# Patient Record
Sex: FEMALE | NOT HISPANIC OR LATINO | URBAN - METROPOLITAN AREA
[De-identification: names, ages, dates, MRNs, and addresses within clinical notes are randomized per-mention and may not be internally consistent; named-entity substitution may affect disease eponyms.]

---

## 2023-02-07 ENCOUNTER — TELEPHONE (OUTPATIENT)
Dept: PSYCHIATRY | Facility: CLINIC | Age: 68
End: 2023-02-07

## 2023-02-07 NOTE — TELEPHONE ENCOUNTER
Patient called to inquire about [facility] therapy services  Writer advised patient that there is a waitlist and added the patient to the list  Also advised patient to call insurance company for additional resources

## 2023-02-08 NOTE — TELEPHONE ENCOUNTER
Pt called the [facility] office looking to be scheduled  Writer informed her of the list and once the pt found out she could not be scheduled with Olene Aschoff she declined services

## 2023-03-06 ENCOUNTER — TELEPHONE (OUTPATIENT)
Dept: PSYCHIATRY | Facility: CLINIC | Age: 68
End: 2023-03-06
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
## 2023-03-13 ENCOUNTER — TELEPHONE (OUTPATIENT)
Dept: PSYCHIATRY | Facility: CLINIC | Age: 68
End: 2023-03-13

## 2023-03-13 ENCOUNTER — TELEMEDICINE (OUTPATIENT)
Dept: PSYCHIATRY | Facility: CLINIC | Age: 68
End: 2023-03-13

## 2023-03-13 DIAGNOSIS — F43.10 POST TRAUMATIC STRESS DISORDER (PTSD): Primary | ICD-10-CM
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
## 2023-03-28 ENCOUNTER — TELEMEDICINE (OUTPATIENT)
Dept: PSYCHIATRY | Facility: CLINIC | Age: 68
End: 2023-03-28

## 2023-03-28 DIAGNOSIS — F43.10 POST TRAUMATIC STRESS DISORDER (PTSD): Primary | ICD-10-CM

## 2023-03-28 NOTE — BH TREATMENT PLAN
"[address] [place] [address] Drive, [address]  1955     Date of Initial Psychotherapy Assessment: 3/13/23   Date of Current Treatment Plan: 03/28/23  Treatment Plan Target Date: 09/13/23  Treatment Plan Expiration Date: 09/13/23    Diagnosis:   1  Post traumatic stress disorder (PTSD)            Area(s) of Need: \"I need to let all of that stuff go (trauma)\"      Long Term Goal 1 (in the client's own words): \"    Stage of Change: {SL AMB PSYCH STAGE OF EYEOJR:42766}    Target Date for completion: ***     Anticipated therapeutic modalities: ***     People identified to complete this goal: ***      Objective 1: (identify the means of measuring success in meeting the objective): ***      Objective 2: (identify the means of measuring success in meeting the objective): ***      Long Term Goal 2 (in the client's own words): ***    Stage of Change: {SL AMB PSYCH STAGE OF CRWRNH:65690}    Target Date for completion: ***     Anticipated therapeutic modalities: ***     People identified to complete this goal: ***      Objective 1: (identify the means of measuring success in meeting the objective): ***      Objective 2: (identify the means of measuring success in meeting the objective): ***     Long Term Goal 3 (in the client's own words): ***    Stage of Change: {SL AMB PSYCH STAGE OF KWJKMX:64953}    Target Date for completion: ***     Anticipated therapeutic modalities: ***     People identified to complete this goal: ***      Objective 1: (identify the means of measuring success in meeting the objective): ***      Objective 2: (identify the means of measuring success in meeting the objective): ***     I am currently under the care of a [facility]'s psychiatric provider: {YES/NO:20200}    My [facility] psychiatric provider is: ***    I am currently taking psychiatric medications: {SL AMB TAKING PSYCH MEDS:23578}    I feel that I will be ready for discharge from mental health care when I " reach the following (measurable goal/objective): ***    For children and adults who have a legal guardian:   Has there been any change to custody orders and/or guardianship status? {Yes/No/NA:38086}  If yes, attach updated documentation  I have { AMB PSYCH CREATED/UPDATED:50507} my Crisis Plan and have been offered a copy of this plan    [address]: Diagnosis and Treatment Plan explained to [address] [address] {acknowledge/does not acknowledge:[address]381} an understanding of their diagnosis  Lucrecia Pfeiffer { AMB PSYCH AGREE/DISAGREE:81332} this treatment plan  I have been offered a copy of this Treatment Plan   {YES/NO:20200}

## 2023-03-29 NOTE — PSYCH
Virtual Regular Visit    Verification of patient location:    Patient is located in the following state in which I hold an active license [place]      Assessment/Plan:    Problem List Items Addressed This Visit        Other    Post traumatic stress disorder (PTSD) - Primary       Goals addressed in session: Goal 1          Reason for visit is No chief complaint on file  Encounter provider Neo Reid LCSW    Provider located at [address] [place] [id number] [phone]      Recent Visits  Date Type Provider Dept   03/28/23 Telemedicine Nabil Michi Romero LCSW [facility]   Showing recent visits within past 7 days and meeting all other requirements  Future Appointments  No visits were found meeting these conditions  Showing future appointments within next 150 days and meeting all other requirements       The patient was identified by name and date of birth  Miguel Briggs was informed that this is a telemedicine visit and that the visit is being conducted throught[facility]  She agrees to proceed     My office door was closed  No one else was in the room  She acknowledged consent and understanding of privacy and security of the video platform  The patient has agreed to participate and understands they can discontinue the visit at any time  Patient is aware this is a billable service  Subjective  Miguel Briggs is a 78 y o  female    HPI     No past medical history on file  No past surgical history on file  No current outpatient medications on file  No current facility-administered medications for this visit  Not on File    Review of Systems    Video Exam    There were no vitals filed for this visit  Physical Exam     Behavioral Health Psychotherapy Progress Note    Psychotherapy Provided: Individual Psychotherapy     1  "Post traumatic stress disorder (PTSD)            Goals addressed in session:      DATA: Met with Leann Cantu for scheduled individual session  Topics of discussion included family stressors and trauma history  Client shows evidence of utilizing distress tolerance skills skills to manage mental health symptoms  During this session, this clinician used the following therapeutic modalities: mindfulness-based strategies and CBT techniques  Gaston Castro wanted to talk about her traumatic events during this session  She reported struggling to sleep and eat  She is going back to the gym and has an appointment with a psychiatrist     Substance Abuse was not addressed during this session  If the client is diagnosed with a co-occurring substance use disorder, please indicate any changes in the frequency or amount of use: n/a  Stage of change for addressing substance use diagnoses: No substance use/Not applicable    ASSESSMENT:  Pedro Stroud presents with a Euthymic/ normal mood  her affect is Normal range and intensity, which is congruent, with her mood and the content of the session  The client has made progress on their goals  Pedro Stroud presents with a none risk of suicide, none risk of self-harm, and none risk of harm to others  For any risk assessment that surpasses a \"low\" rating, a safety plan must be developed  A safety plan was indicated: no  If yes, describe in detail n/a    PLAN: Between sessions, Pedro Stroud will use CBT model to identify cognitive distortions  At the next session, the therapist will use Cognitive Behavioral Therapy to address PTSD  Behavioral Health Treatment Plan and Discharge Planning: Pedro Stroud is aware of and agrees to continue to work on their treatment plan  They have identified and are working toward their discharge goals   no    Visit start and stop times:    03/29/23  Start Time: 1330  Stop Time: 1426  Total Visit Time: 56 minutes      "

## 2023-05-04 ENCOUNTER — TELEMEDICINE (OUTPATIENT)
Dept: PSYCHIATRY | Facility: CLINIC | Age: 68
End: 2023-05-04

## 2023-05-04 DIAGNOSIS — F43.10 POST TRAUMATIC STRESS DISORDER (PTSD): Primary | ICD-10-CM

## 2023-05-04 NOTE — PSYCH
Virtual Regular Visit    Verification of patient location:    Patient is located at Home in the following state in which I hold an active license [place]      Assessment/Plan:    Problem List Items Addressed This Visit    None      Goals addressed in session: Goal 1          Reason for visit is No chief complaint on file  Encounter provider Oz Duran LCSW    Provider located at [address] [address]  [phone]      Recent Visits  No visits were found meeting these conditions  Showing recent visits within past 7 days and meeting all other requirements  Future Appointments  No visits were found meeting these conditions  Showing future appointments within next 150 days and meeting all other requirements       The patient was identified by name and date of birth  Smiley Phelan was informed that this is a telemedicine visit and [facility]  She agrees to proceed     My office door was closed  No one else was in the room  She acknowledged consent and understanding of privacy and security of the video platform  The patient has agreed to participate and understands they can discontinue the visit at any time  Patient is aware this is a billable service  Subjective  Smiley Phelan is a 78 y o  female    HPI     No past medical history on file  No past surgical history on file  No current outpatient medications on file  No current facility-administered medications for this visit  Not on File    Review of Systems    Video Exam    There were no vitals filed for this visit  Physical Exam     Behavioral Health Psychotherapy Progress Note    Psychotherapy Provided: Individual Psychotherapy     1   Post traumatic stress disorder (PTSD)            Goals addressed in session: Goal 1     DATA: Met with Maria R Vieira for "scheduled individual session  Topics of discussion included trauma history and physical health concerns  Client shows evidence of utilizing Mindfulness-based strategies and effective communication skills skills to manage mental health symptoms  During this session, this clinician used the following therapeutic modalities: mindfulness-based strategies and CBT techniques  Bertrand Shaffer reported improvement in practicing mindfulness and breathing activities  She was able to identify benefits on her physical and mental health  During this session, she learned about \"positive affirmations and their impact on her emotions  Bertrand Shaffer learned about \"the mindfulness meditation\"     Substance Abuse was not addressed during this session  If the client is diagnosed with a co-occurring substance use disorder, please indicate any changes in the frequency or amount of use: n/a  Stage of change for addressing substance use diagnoses: No substance use/Not applicable    ASSESSMENT:  Lorena You presents with a Euthymic/ normal mood  her affect is Normal range and intensity, which is congruent, with her mood and the content of the session  The client has made progress on their goals  Lorena You presents with a none risk of suicide, none risk of self-harm, and none risk of harm to others  For any risk assessment that surpasses a \"low\" rating, a safety plan must be developed  A safety plan was indicated: no  If yes, describe in detail n/a    PLAN: Between sessions, Lorena You will use MBSR strategie and CBT techniques  At the next session, the therapist will use Cognitive Behavioral Therapy and Mindfulness-based Strategies to address PTSD symptoms  Behavioral Health Treatment Plan and Discharge Planning: Lorena You is aware of and agrees to continue to work on their treatment plan  They have identified and are working toward their discharge goals   no    Visit start and stop " times:    05/04/23  Start Time: 1300

## 2023-05-18 ENCOUNTER — TELEMEDICINE (OUTPATIENT)
Dept: PSYCHIATRY | Facility: CLINIC | Age: 68
End: 2023-05-18

## 2023-05-18 DIAGNOSIS — F43.10 POST TRAUMATIC STRESS DISORDER (PTSD): Primary | ICD-10-CM

## 2023-05-18 NOTE — PSYCH
Virtual Regular Visit    Verification of patient location:    Patient is located at Home in the following state in which I hold an active license [place]      Assessment/Plan:    Problem List Items Addressed This Visit        Other    Post traumatic stress disorder (PTSD) - Primary       Goals addressed in session: Goal 1          Reason for visit is No chief complaint on file  Encounter provider Kimberly Keita LCSW    Provider located at [address] [place] [id number] [phone]      Recent Visits  No visits were found meeting these conditions  Showing recent visits within past 7 days and meeting all other requirements  Today's Visits  Date Type Provider Dept   05/18/23 Telemedicine Raquel Romero LCSW [facility]   Showing today's visits and meeting all other requirements  Future Appointments  No visits were found meeting these conditions  Showing future appointments within next 150 days and meeting all other requirements       The patient was identified by name and date of birth  Gayle Estrada was informed that this is a telemedicine visit and that [facility]  She agrees to proceed     My office door was closed  No one else was in the room  She acknowledged consent and understanding of privacy and security of the video platform  The patient has agreed to participate and understands they can discontinue the visit at any time  Patient is aware this is a billable service  Subjective  Gayle Estrada is a 78 y o  female  HPI     No past medical history on file  No past surgical history on file  No current outpatient medications on file  No current facility-administered medications for this visit          Not on File    Review of Systems    Video Exam    There were no vitals filed for this "visit  Physical Exam     Behavioral Health Psychotherapy Progress Note    Psychotherapy Provided: Individual Psychotherapy     1  Post traumatic stress disorder (PTSD)            Goals addressed in session: Goal 1     DATA: Bobo Herrera talked about her physical health and emotional  She is able to practice gratitude and mindfulness  To stay positive  She is able to identify moments of daria  Bobo Herrera is practicing assertive communication to set healthy boundaries and express her limitations and needs  Bobo Herrera talked about her possible brain surgery for Parkinson  During this session, this clinician used the following therapeutic modalities: Mindfulness-based Strategies    Substance Abuse was not addressed during this session  If the client is diagnosed with a co-occurring substance use disorder, please indicate any changes in the frequency or amount of use: n/a  Stage of change for addressing substance use diagnoses: No substance use/Not applicable    ASSESSMENT:  Janice Maradiaga presents with a Euthymic/ normal mood  her affect is Normal range and intensity, which is congruent, with her mood and the content of the session  The client has made progress on their goals  Janice Maradiaga presents with a none risk of suicide, none risk of self-harm, and none risk of harm to others  For any risk assessment that surpasses a \"low\" rating, a safety plan must be developed  A safety plan was indicated: no  If yes, describe in detail n/a    PLAN: Between sessions, Janice Maradiaga will use mindfulness strategies  At the next session, the therapist will use Mindfulness-based Strategies to address PTSD  Behavioral Health Treatment Plan and Discharge Planning: Janice Maradiaga is aware of and agrees to continue to work on their treatment plan  They have identified and are working toward their discharge goals   no    Visit start and stop times:    05/18/23  Start Time: 1300  Stop Time: 1346  Total Visit Time: " 46 minutes

## 2023-06-02 ENCOUNTER — TELEMEDICINE (OUTPATIENT)
Dept: PSYCHIATRY | Facility: CLINIC | Age: 68
End: 2023-06-02

## 2023-06-02 DIAGNOSIS — F43.10 POST TRAUMATIC STRESS DISORDER (PTSD): Primary | ICD-10-CM

## 2023-06-02 NOTE — PSYCH
Virtual Regular Visit    Verification of patient location:    Patient is located at Home in the following state in which I hold an active license [place]      Assessment/Plan:    Problem List Items Addressed This Visit        Other    Post traumatic stress disorder (PTSD) - Primary       Goals addressed in session: Goal 1          Reason for visit is No chief complaint on file  Encounter provider David Soto LCSW    Provider located at [address] [address] [phone]      Recent Visits  No visits were found meeting these conditions  Showing recent visits within past 7 days and meeting all other requirements  Today's Visits  Date Type Provider Dept   06/02/23 Telemedicine Sabi Romero LCSW [facility]   Showing today's visits and meeting all other requirements  Future Appointments  No visits were found meeting these conditions  Showing future appointments within next 150 days and meeting all other requirements       The patient was identified by name and date of birth  Jem Neo was informed that this is a telemedicine visit and that the [facility]  She agrees to proceed     My office door was closed  No one else was in the room  She acknowledged consent and understanding of privacy and security of the video platform  The patient has agreed to participate and understands they can discontinue the visit at any time  Patient is aware this is a billable service  Subjective  Jem Larson is a 78 y o  female    HPI     No past medical history on file  No past surgical history on file  No current outpatient medications on file  No current facility-administered medications for this visit          Not on File    Review of Systems    Video Exam    There were no vitals filed for this "visit  Physical Exam     Behavioral Health Psychotherapy Progress Note    Psychotherapy Provided: Individual Psychotherapy     1  Post traumatic stress disorder (PTSD)            Goals addressed in session: Goal 1     DATA: Sue Onofre continues to make progress in feeling relief about her past while talking about it  She is starting to prioritize herself  She find it a bit hard but is putting a lot of energy on it  Sue Onofre talked about her friendship and how much she likes to be helpful to them  She is working on finding a balance between helping them and making sure that she does not get physically hurt  Sue Onofre continues to have a positive mindset  During this session, this clinician used the following therapeutic modalities: Cognitive Behavioral Therapy and Mindfulness-based Strategies    Substance Abuse was not addressed during this session  If the client is diagnosed with a co-occurring substance use disorder, please indicate any changes in the frequency or amount of use: N/A  Stage of change for addressing substance use diagnoses: No substance use/Not applicable    ASSESSMENT:  Johnny Ferguson presents with a Euthymic/ normal mood  her affect is Normal range and intensity, which is congruent, with her mood and the content of the session  The client has made progress on their goals  Johnny Ferguson presents with a none risk of suicide, none risk of self-harm, and none risk of harm to others  For any risk assessment that surpasses a \"low\" rating, a safety plan must be developed  A safety plan was indicated: no  If yes, describe in detail N/A    PLAN: Between sessions, Johnny Ferguson will continue to use CBT and MBSR strategies to manage her symptoms of PTSD  At the next session, the therapist will use Cognitive Processing Therapy and Mindfulness-based Strategies to address PTSD      Behavioral Health Treatment Plan and Discharge Planning: Johnny Ferguson is aware of and agrees to " continue to work on their treatment plan  They have identified and are working toward their discharge goals   no    Visit start and stop times:    06/02/23  Start Time: 1130  Stop Time: 1222  Total Visit Time: 52 minutes

## 2023-06-20 ENCOUNTER — TELEMEDICINE (OUTPATIENT)
Dept: PSYCHIATRY | Facility: CLINIC | Age: 68
End: 2023-06-20
Payer: COMMERCIAL

## 2023-06-20 DIAGNOSIS — F43.10 POST TRAUMATIC STRESS DISORDER (PTSD): Primary | ICD-10-CM

## 2023-06-20 PROCEDURE — 90834 PSYTX W PT 45 MINUTES: CPT | Performed by: STUDENT IN AN ORGANIZED HEALTH CARE EDUCATION/TRAINING PROGRAM

## 2023-06-20 NOTE — PSYCH
Virtual Regular Visit    Verification of patient location:    Patient is located at Home in the following state in which I hold an active license [place]      Assessment/Plan:    Problem List Items Addressed This Visit        Other    Post traumatic stress disorder (PTSD) - Primary       Goals addressed in session: Goal 1          Reason for visit is No chief complaint on file  Encounter provider Merced Holland LCSW    Provider located at [address] [facility] [address] [phone]      Recent Visits  No visits were found meeting these conditions  Showing recent visits within past 7 days and meeting all other requirements  Today's Visits  Date Type Provider Dept   06/20/23 Telemedicine Becky Romero LCSW [facility]   Showing today's visits and meeting all other requirements  Future Appointments  No visits were found meeting these conditions  Showing future appointments within next 150 days and meeting all other requirements       The patient was identified by name and date of birth  Fredia Gaucher was informed that this is a telemedicine visit and that the [facility]  She agrees to proceed     My office door was closed  No one else was in the room  She acknowledged consent and understanding of privacy and security of the video platform  The patient has agreed to participate and understands they can discontinue the visit at any time  Patient is aware this is a billable service  Subjective  Fredia Gaucher is a 78 y o  female    HPI     No past medical history on file  No past surgical history on file  No current outpatient medications on file  No current facility-administered medications for this visit          Not on File    Review of Systems    Video Exam    There were no vitals filed for this "visit  Physical Exam       Behavioral Health Psychotherapy Progress Note    Psychotherapy Provided: Individual Psychotherapy     1  Post traumatic stress disorder (PTSD)            Goals addressed in session: Goal 1     DATA: Betty Bello reported feeling worried about her friend that is sick in the hospital  Betty Bello is able to identify cognitive distortions  Betty Bello uses positive affirmations to get out of cognitive distortions like catastrophic thinking  She is implementing mindfulness strategies (visualization and positive affirmations)  Betty Bello makes projections about her future on those who have Parkinson's  During this session, this clinician used the following therapeutic modalities: Cognitive Behavioral Therapy and Mindfulness-based Strategies    Substance Abuse was not addressed during this session  If the client is diagnosed with a co-occurring substance use disorder, please indicate any changes in the frequency or amount of use: n/a  Stage of change for addressing substance use diagnoses: No substance use/Not applicable    ASSESSMENT:  Porter Sebastian presents with a Anxious mood  her affect is Normal range and intensity, which is congruent, with her mood and the content of the session  The client has made progress on their goals  Porter Sebastian presents with a none risk of suicide, none risk of self-harm, and none risk of harm to others  For any risk assessment that surpasses a \"low\" rating, a safety plan must be developed  A safety plan was indicated: no  If yes, describe in detail n/a    PLAN: Between sessions, Porter Sebastian will use positive affirmations, visualization and deep breathing  At the next session, the therapist will use Cognitive Behavioral Therapy to address PTSD symptoms  Behavioral Health Treatment Plan and Discharge Planning: Porter Sebastian is aware of and agrees to continue to work on their treatment plan   They have identified and are working toward their " discharge goals   no    Visit start and stop times:    06/20/23  Start Time: 1330  Stop Time: 1420  Total Visit Time: 50 minutes

## 2023-07-06 ENCOUNTER — TELEMEDICINE (OUTPATIENT)
Dept: PSYCHIATRY | Facility: CLINIC | Age: 68
End: 2023-07-06
Payer: COMMERCIAL

## 2023-07-06 DIAGNOSIS — F43.10 POST TRAUMATIC STRESS DISORDER (PTSD): Primary | ICD-10-CM

## 2023-07-06 PROCEDURE — 90837 PSYTX W PT 60 MINUTES: CPT | Performed by: STUDENT IN AN ORGANIZED HEALTH CARE EDUCATION/TRAINING PROGRAM

## 2023-07-06 NOTE — PSYCH
Virtual Regular Visit    Verification of patient location:    Patient is located at Home in the following state in which I hold an [place] license [place]      Assessment/Plan:    Problem List Items Addressed This Visit        Other    Post traumatic stress disorder (PTSD) - Primary       Goals addressed in session: Goal 1          Reason for visit is No chief complaint on file. Encounter provider Wilman Waddell LCSW    Provider located at [address] [address] [phone]      Recent Visits  No visits were found meeting these conditions. Showing recent visits within past 7 days and meeting all other requirements  Today's Visits  Date Type Provider Dept   07/06/23 Telemedicine Amaury Romero LCSW [facility]   Showing today's visits and meeting all other requirements  Future Appointments  No visits were found meeting these conditions. Showing future appointments within next 150 days and meeting all other requirements       The patient was identified by name and date of birth. Cristobal Tong was informed that this is a telemedicine visit and [facility] She agrees to proceed. .  My office door was closed. No one else was in the room. She acknowledged consent and understanding of privacy and security of the video platform. The patient has agreed to participate and understands they can discontinue the visit at any time. Patient is aware this is a billable service. Subjective  Cristobal Tong is a 78 y.o. female  . HPI     No past medical history on file. No past surgical history on file. No current outpatient medications on file. No current facility-administered medications for this visit.         Not on File    Review of Systems    Video Exam    There were no vitals filed for this visit.    Physical Exam     Behavioral Health Psychotherapy Progress Note    Psychotherapy Provided: Individual Psychotherapy     1. Post traumatic stress disorder (PTSD)            Goals addressed in session: Goal 1     DATA: Belen Holt reported feeling very stressed due to situation happening in her to her daughter and her friend. Belen Holt continues to make progress in implementing grounding activities by walking bare foot in the grass. She is walking more consistently. Belen Holt talked about her feeling about brain surgery. She processed her thoughts and emotions about it. During this session, this clinician used the following therapeutic modalities: Client-centered Therapy and Cognitive Behavioral Therapy    Substance Abuse was not addressed during this session. If the client is diagnosed with a co-occurring substance use disorder, please indicate any changes in the frequency or amount of use: n/a. Stage of change for addressing substance use diagnoses: No substance use/Not applicable    ASSESSMENT:  Vee Duran presents with a Euthymic/ normal mood. her affect is Normal range and intensity, which is congruent, with her mood and the content of the session. The client has made progress on their goals. Vee Duran presents with a none risk of suicide, none risk of self-harm, and none risk of harm to others. For any risk assessment that surpasses a "low" rating, a safety plan must be developed. A safety plan was indicated: no  If yes, describe in detail n/a    PLAN: Between sessions, Vee Duran will continue to use MBSR strategies to manage her feelings of anxiety. At the next session, the therapist will use Cognitive Behavioral Therapy and Mindfulness-based Strategies to address PTSD symptoms. Behavioral Health Treatment Plan and Discharge Planning: Vee Duran is aware of and agrees to continue to work on their treatment plan.  They have identified and are working toward their discharge goals.  no    Visit start and stop times:    07/06/23  Start Time: 1305  Stop Time: 1358  Total Visit Time: 53 minutes

## 2023-07-20 ENCOUNTER — TELEMEDICINE (OUTPATIENT)
Dept: PSYCHIATRY | Facility: CLINIC | Age: 68
End: 2023-07-20

## 2023-07-20 DIAGNOSIS — F43.10 POST TRAUMATIC STRESS DISORDER (PTSD): Primary | ICD-10-CM

## 2023-07-20 NOTE — PSYCH
Virtual Regular Visit    Verification of patient location:    Patient is located at Home in the following state in which I hold an active license [place]      Assessment/Plan:    Problem List Items Addressed This Visit        Other    Post traumatic stress disorder (PTSD) - Primary       Goals addressed in session: Goal 1          Reason for visit is No chief complaint on file. Encounter provider Lenny Ballard LCSW    Provider located at [address] [place] [address] [phone]      Recent Visits  No visits were found meeting these conditions. Showing recent visits within past 7 days and meeting all other requirements  Future Appointments  No visits were found meeting these conditions. Showing future appointments within next 150 days and meeting all other requirements       The patient was identified by name and date of birth. Howie Valdes was informed that this is a telemedicine visit and that [email or web address]. She agrees to proceed. .  My office door was closed. No one else was in the room. She acknowledged consent and understanding of privacy and security of the video platform. The patient has agreed to participate and understands they can discontinue the visit at any time. Patient is aware this is a billable service. Jay Valdes is a 78 y.o. female. HPI     No past medical history on file. No past surgical history on file. No current outpatient medications on file. No current facility-administered medications for this visit. Not on File    Review of Systems    Video Exam    There were no vitals filed for this visit. Physical Exam     Behavioral Health Psychotherapy Progress Note    Psychotherapy Provided: Individual Psychotherapy     1.  Post traumatic stress disorder (PTSD)            Goals addressed in session: Goal 1     DATA: Sandra Almanza is implementing CBT strategies to manage her feelings of anxiety. Sandra Almanza uses humor to cope   During this session, this clinician used the following therapeutic modalities: Client-centered Therapy and Cognitive Behavioral Therapy    Substance Abuse was not addressed during this session. If the client is diagnosed with a co-occurring substance use disorder, please indicate any changes in the frequency or amount of use: n/a. Stage of change for addressing substance use diagnoses: No substance use/Not applicable    ASSESSMENT:  Frank Kelley presents with a Euthymic/ normal mood. her affect is Normal range and intensity, which is congruent, with her mood and the content of the session. The client has made progress on their goals. Frank Kelley presents with a none risk of suicide, none risk of self-harm, and none risk of harm to others. For any risk assessment that surpasses a "low" rating, a safety plan must be developed. A safety plan was indicated: no  If yes, describe in detail n/a    PLAN: Between sessions, Frank Kelley will continue to excersice, use positive thinking, positive affirmations, and mindfulness strategies. At the next session, the therapist will use Client-centered Therapy to address PTSD symptms. Behavioral Health Treatment Plan and Discharge Planning: Frank Kelley is aware of and agrees to continue to work on their treatment plan. They have identified and are working toward their discharge goals.  no    Visit start and stop times:    07/20/23  Start Time: 1300

## 2023-08-03 ENCOUNTER — TELEMEDICINE (OUTPATIENT)
Dept: PSYCHIATRY | Facility: CLINIC | Age: 68
End: 2023-08-03
Payer: COMMERCIAL

## 2023-08-03 DIAGNOSIS — F43.10 POST TRAUMATIC STRESS DISORDER (PTSD): Primary | ICD-10-CM

## 2023-08-03 PROCEDURE — 90834 PSYTX W PT 45 MINUTES: CPT | Performed by: STUDENT IN AN ORGANIZED HEALTH CARE EDUCATION/TRAINING PROGRAM

## 2023-08-03 NOTE — PSYCH
Virtual Regular Visit    Verification of patient location:    Patient is located at Home in the following state in which I hold an active license [place]      Assessment/Plan:    Problem List Items Addressed This Visit        Other    Post traumatic stress disorder (PTSD) - Primary       Goals addressed in session: Goal 1          Reason for visit is No chief complaint on file. Encounter provider Beverly Salgado LCSW    Provider located at [address] [place] [id number] [phone]      Recent Visits  No visits were found meeting these conditions. Showing recent visits within past 7 days and meeting all other requirements  Today's Visits  Date Type Provider Dept   08/03/23 Telemedicine Indra Romero LCSW [facility]   Showing today's visits and meeting all other requirements  Future Appointments  No visits were found meeting these conditions. Showing future appointments within next 150 days and meeting all other requirements       The patient was identified by name and date of birth. Kelby Solis was informed that this is a telemedicine visit and [facility] She agrees to proceed. .  My office door was closed. No one else was in the room. She acknowledged consent and understanding of privacy and security of the video platform. The patient has agreed to participate and understands they can discontinue the visit at any time. Patient is aware this is a billable service. Subjective  Kelby Solis is a 78 y.o. female. HPI     No past medical history on file. No past surgical history on file. No current outpatient medications on file. No current facility-administered medications for this visit.         Not on File    Review of Systems    Video Exam    There were no vitals filed for this visit.    Physical Exam     Behavioral Health Psychotherapy Progress Note    Psychotherapy Provided: Individual Psychotherapy     1. Post traumatic stress disorder (PTSD)            Goals addressed in session: Goal 1     DATA: Asia Pham processed her feelings about her surgery and in ways that she may be sabotaging her opportunity to have this procedure. Asia Pham made significant connections during this session. During this session, this clinician used the following therapeutic modalities: Client-centered Therapy, Cognitive Behavioral Therapy, Mindfulness-based Strategies and Motivational Interviewing    Substance Abuse was not addressed during this session. If the client is diagnosed with a co-occurring substance use disorder, please indicate any changes in the frequency or amount of use: n/a. Stage of change for addressing substance use diagnoses: No substance use/Not applicable    ASSESSMENT:  Himanshu Cunningham presents with a Euthymic/ normal mood. her affect is Normal range and intensity, which is congruent, with her mood and the content of the session. The client has made progress on their goals. Himanshu Cunningham presents with a none risk of suicide, none risk of self-harm, and none risk of harm to others. For any risk assessment that surpasses a "low" rating, a safety plan must be developed. A safety plan was indicated: no  If yes, describe in detail n/a    PLAN: Between sessions, Himanshu Cunningham will prioritize herself and go to the gym. At the next session, the therapist will use Client-centered Therapy, Cognitive Behavioral Therapy and Mindfulness-based Strategies to address PTSD. Behavioral Health Treatment Plan and Discharge Planning: Himanshu Cunningham is aware of and agrees to continue to work on their treatment plan. They have identified and are working toward their discharge goals.  no    Visit start and stop times:    08/03/23  Start Time: 1400  Stop Time: 1448  Total Visit Time: 48 minutes

## 2023-08-17 ENCOUNTER — TELEMEDICINE (OUTPATIENT)
Dept: PSYCHIATRY | Facility: CLINIC | Age: 68
End: 2023-08-17
Payer: COMMERCIAL

## 2023-08-17 DIAGNOSIS — F43.10 POST TRAUMATIC STRESS DISORDER (PTSD): Primary | ICD-10-CM

## 2023-08-17 PROCEDURE — 90834 PSYTX W PT 45 MINUTES: CPT | Performed by: STUDENT IN AN ORGANIZED HEALTH CARE EDUCATION/TRAINING PROGRAM

## 2023-08-21 NOTE — PSYCH
Virtual Regular Visit    Verification of patient location:    Patient is located at Home in the following state in which I hold an active license [place]      Assessment/Plan:    Problem List Items Addressed This Visit        Other    Post traumatic stress disorder (PTSD) - Primary       Goals addressed in session: Goal 1          Reason for visit is No chief complaint on file. Encounter provider Camryn Castorena LCSW    Provider located at [address] [place] [id number] [phone]      Recent Visits  Date Type Provider Dept   08/17/23 Telemedicine Arthur Romero LCSW [facility]   Showing recent visits within past 7 days and meeting all other requirements  Future Appointments  No visits were found meeting these conditions. Showing future appointments within next 150 days and meeting all other requirements       The patient was identified by name and date of birth. Venus Dupree was informed that this is a telemedicine visit and that [facility]. She agrees to proceed. .  My office door was closed. No one else was in the room. She acknowledged consent and understanding of privacy and security of the video platform. The patient has agreed to participate and understands they can discontinue the visit at any time. Patient is aware this is a billable service. Subjective  Venus Dupree is a 78 y.o. female  . HPI     No past medical history on file. No past surgical history on file. No current outpatient medications on file. No current facility-administered medications for this visit. Not on File    Review of Systems    Video Exam    There were no vitals filed for this visit.     Physical Exam     Behavioral Health Psychotherapy Progress Note    Psychotherapy Provided: Individual Psychotherapy     1. Post traumatic stress disorder (PTSD)            Goals addressed in session: Goal 1     DATA: Ronnie Lara continues to make great progress in implementing mindfulness and prioritizing herself. She is exersing more regularly and is working on identifying negative thoughts. During this session, this clinician used the following therapeutic modalities: Cognitive Behavioral Therapy    Substance Abuse was not addressed during this session. If the client is diagnosed with a co-occurring substance use disorder, please indicate any changes in the frequency or amount of use: n/a. Stage of change for addressing substance use diagnoses: No substance use/Not applicable    ASSESSMENT:  Lara Tristan presents with a Euthymic/ normal mood. her affect is Normal range and intensity, which is congruent, with her mood and the content of the session. The client has made progress on their goals. Lara Tristan presents with a none risk of suicide, none risk of self-harm, and none risk of harm to others. For any risk assessment that surpasses a "low" rating, a safety plan must be developed. A safety plan was indicated: no  If yes, describe in detail n/a    PLAN: Between sessions, Lara Tristan will use CBT strategies to identify cognitive distortions. At the next session, the therapist will use Cognitive Behavioral Therapy to address PTSD symptoms. Behavioral Health Treatment Plan and Discharge Planning: Lara Tristan is aware of and agrees to continue to work on their treatment plan. They have identified and are working toward their discharge goals.  no    Visit start and stop times:    08/17/23  Start Time: 1400  Stop Time: 1445  Total Visit Time: 45 minutes

## 2023-08-31 ENCOUNTER — TELEMEDICINE (OUTPATIENT)
Dept: PSYCHIATRY | Facility: CLINIC | Age: 68
End: 2023-08-31
Payer: COMMERCIAL

## 2023-08-31 DIAGNOSIS — F43.10 POST TRAUMATIC STRESS DISORDER (PTSD): Primary | ICD-10-CM

## 2023-08-31 PROCEDURE — 90837 PSYTX W PT 60 MINUTES: CPT | Performed by: STUDENT IN AN ORGANIZED HEALTH CARE EDUCATION/TRAINING PROGRAM

## 2023-08-31 NOTE — PSYCH
Virtual Regular Visit    Verification of patient location:    Patient is located at Home in the following state in which I hold an active license [place]      Assessment/Plan:    Problem List Items Addressed This Visit        Other    Post traumatic stress disorder (PTSD) - Primary       Goals addressed in session: Goal 1          Reason for visit is No chief complaint on file. Encounter provider Toma Acevedo LCSW    Provider located at [address] [place] [id number] [phone]      Recent Visits  Date Type Provider Dept   08/31/23 Telemedicine Elizabeth Romero LCSW [facility]   Showing recent visits within past 7 days and meeting all other requirements  Future Appointments  No visits were found meeting these conditions. Showing future appointments within next 150 days and meeting all other requirements       The patient was identified by name and date of birth. Sharmila Dominique was informed that this is a telemedicine visit and that the [facility] [email or web address] "[employer] She agrees to proceed. .  My office door was closed. No one else was in the room. She acknowledged consent and understanding of privacy and security of the video platform. The patient has agreed to participate and understands they can discontinue the visit at any time. Patient is aware this is a billable service. Subjective  Sharmila Dominique is a 78 y.o. female. HPI     No past medical history on file. No past surgical history on file. No current outpatient medications on file. No current facility-administered medications for this visit. Not on File    Review of Systems    Video Exam    There were no vitals filed for this visit.     Physical Exam     Behavioral Health Psychotherapy Progress Note    Psychotherapy Provided: Individual Psychotherapy 1. Post traumatic stress disorder (PTSD)            Goals addressed in session: Goal 1     DATA: Liz Smith continues to make progress at implementing CBT and MBSR strategies to cope with her feelings of anxiety and depression. She is practicing kindness, moments of daria, and to identify cognitive distortions. Liz Smith is working hard on being social. Liz Smith processed her feelings about previous traumatic events. During this session, this clinician used the following therapeutic modalities: Cognitive Behavioral Therapy    Substance Abuse was not addressed during this session. If the client is diagnosed with a co-occurring substance use disorder, please indicate any changes in the frequency or amount of use: NA/. Stage of change for addressing substance use diagnoses: No substance use/Not applicable    ASSESSMENT:  Chanel Holden presents with a Euthymic/ normal mood. her affect is Normal range and intensity, which is congruent, with her mood and the content of the session. The client has made progress on their goals. Chanel Holden presents with a none risk of suicide, none risk of self-harm, and none risk of harm to others. For any risk assessment that surpasses a "low" rating, a safety plan must be developed. A safety plan was indicated: no  If yes, describe in detail N/A    PLAN: Between sessions, Chanel Holden will continue to implement CBT strategies. At the next session, the therapist will use Cognitive Behavioral Therapy to address ANXIETY. Behavioral Health Treatment Plan and Discharge Planning: Chanel Holden is aware of and agrees to continue to work on their treatment plan. They have identified and are working toward their discharge goals.  no    Visit start and stop times:    08/31/23  Start Time: 1400  Stop Time: 1457  Total Visit Time: 57 minutes

## 2023-09-13 ENCOUNTER — TELEMEDICINE (OUTPATIENT)
Dept: PSYCHIATRY | Facility: CLINIC | Age: 68
End: 2023-09-13
Payer: COMMERCIAL

## 2023-09-13 DIAGNOSIS — F43.10 POST TRAUMATIC STRESS DISORDER (PTSD): Primary | ICD-10-CM

## 2023-09-13 PROCEDURE — 90837 PSYTX W PT 60 MINUTES: CPT | Performed by: STUDENT IN AN ORGANIZED HEALTH CARE EDUCATION/TRAINING PROGRAM

## 2023-09-13 NOTE — PSYCH
Virtual Regular Visit    Verification of patient location:    Patient is located at Home in the following state in which I hold an active license [place]      Assessment/Plan:    Problem List Items Addressed This Visit        Other    Post traumatic stress disorder (PTSD) - Primary       Goals addressed in session: Goal 1          Reason for visit is No chief complaint on file. Encounter provider Manuel Castellanos LCSW    Provider located at [address] [place] [id number] [phone]      Recent Visits  No visits were found meeting these conditions. Showing recent visits within past 7 days and meeting all other requirements  Today's Visits  Date Type Provider Dept   09/13/23 Telemedicine Nithinalysa Romero LCSW [facility]   Showing today's visits and meeting all other requirements  Future Appointments  No visits were found meeting these conditions. Showing future appointments within next 150 days and meeting all other requirements       The patient was identified by name and date of birth. Phuc Carrasquillo was informed that this is a telemedicine visit and that [facility] [place] [facility] She agrees to proceed. .  My office door was closed. No one else was in the room. She acknowledged consent and understanding of privacy and security of the video platform. The patient has agreed to participate and understands they can discontinue the visit at any time. Patient is aware this is a billable service. Subjective  Phuc Carrasquillo is a 78 y.o. female. HPI     No past medical history on file. No past surgical history on file. No current outpatient medications on file. No current facility-administered medications for this visit.         Not on File    Review of Systems    Video Exam    There were no vitals filed for this visit.    Physical Exam     Behavioral Health Psychotherapy Progress Note    Psychotherapy Provided: Individual Psychotherapy     1. Post traumatic stress disorder (PTSD)            Goals addressed in session: Goal 1     DATA: Sanna Stevens processed her feelings about her relationship with her youngest daughter. She was able to identify positive coping skills. Sanna Stevens processed previous traumatic episodes. During this session, this clinician used the following therapeutic modalities: Client-centered Therapy and Cognitive Behavioral Therapy    Substance Abuse was not addressed during this session. If the client is diagnosed with a co-occurring substance use disorder, please indicate any changes in the frequency or amount of use: n/a. Stage of change for addressing substance use diagnoses: No substance use/Not applicable    ASSESSMENT:  Percy Fountain presents with a Euthymic/ normal mood. her affect is Normal range and intensity, which is congruent, with her mood and the content of the session. The client has made progress on their goals. Percy Fountain presents with a none risk of suicide, none risk of self-harm, and none risk of harm to others. For any risk assessment that surpasses a "low" rating, a safety plan must be developed. A safety plan was indicated: no  If yes, describe in detail n/a    PLAN: Between sessions, Percy Fountain will continue to implement positive coping skills. At the next session, the therapist will use Cognitive Behavioral Therapy to address PTSD symptoms. Behavioral Health Treatment Plan and Discharge Planning: Percy Fountain is aware of and agrees to continue to work on their treatment plan. They have identified and are working toward their discharge goals.  no    Visit start and stop times:    09/13/23  Start Time: 1300  Stop Time: 1355  Total Visit Time: 55 minutes

## 2023-09-27 ENCOUNTER — TELEMEDICINE (OUTPATIENT)
Dept: PSYCHIATRY | Facility: CLINIC | Age: 68
End: 2023-09-27
Payer: COMMERCIAL

## 2023-09-27 DIAGNOSIS — F43.10 POST TRAUMATIC STRESS DISORDER (PTSD): Primary | ICD-10-CM

## 2023-09-27 PROCEDURE — 90834 PSYTX W PT 45 MINUTES: CPT | Performed by: STUDENT IN AN ORGANIZED HEALTH CARE EDUCATION/TRAINING PROGRAM

## 2023-09-27 NOTE — PSYCH
Virtual Regular Visit    Verification of patient location:    Patient is located at Home in the following state in which I hold an active license [place]      Assessment/Plan:    Problem List Items Addressed This Visit    None      Goals addressed in session: Goal 1          Reason for visit is No chief complaint on file. Encounter provider Stephy Elizondo LCSW    Provider located at [address] [place] [id number] [phone]      Recent Visits  No visits were found meeting these conditions. Showing recent visits within past 7 days and meeting all other requirements  Future Appointments  No visits were found meeting these conditions. Showing future appointments within next 150 days and meeting all other requirements       The patient was identified by name and date of birth. Jung Calvillo was informed that this is a telemedicine visit and that [facility] [employer]. She agrees to proceed. .  My office door was closed. No one else was in the room. She acknowledged consent and understanding of privacy and security of the video platform. The patient has agreed to participate and understands they can discontinue the visit at any time. Patient is aware this is a billable service. Subjective  Jung Calvillo is a 78 y.o. female  . HPI     No past medical history on file. No past surgical history on file. No current outpatient medications on file. No current facility-administered medications for this visit. Not on File    Review of Systems    Video Exam    There were no vitals filed for this visit. Physical Exam     Behavioral Health Psychotherapy Progress Note    Psychotherapy Provided: Individual Psychotherapy     1.  Post traumatic stress disorder (PTSD)            Goals addressed in session: Goal 1     DATA: Matias Borges processed her feelings about her friend and her relationship with her daughters. Ney Baron is making great progress at implementing mindfulness strategies to cope with her feelings of sadness. Ney Baron is working on being active. During this session, this clinician used the following therapeutic modalities: Cognitive Behavioral Therapy    Substance Abuse was not addressed during this session. If the client is diagnosed with a co-occurring substance use disorder, please indicate any changes in the frequency or amount of use: n/a. Stage of change for addressing substance use diagnoses: No substance use/Not applicable    ASSESSMENT:  Salvador Briones presents with a Euthymic/ normal mood. her affect is Normal range and intensity, which is congruent, with her mood and the content of the session. The client has made progress on their goals. Salvador Briones presents with a none risk of suicide, none risk of self-harm, and none risk of harm to others. For any risk assessment that surpasses a "low" rating, a safety plan must be developed. A safety plan was indicated: no  If yes, describe in detail n/a    PLAN: Between sessions, Salvador Briones will implement MBSR strategies. At the next session, the therapist will use Cognitive Behavioral Therapy and Mindfulness-based Strategies to address ptsd symptoms. Behavioral Health Treatment Plan and Discharge Planning: Salvador Briones is aware of and agrees to continue to work on their treatment plan. They have identified and are working toward their discharge goals.  no    Visit start and stop times:    09/27/23  Start Time: 1300  Stop Time: 1352  Total Visit Time: 52 minutes

## 2023-10-11 ENCOUNTER — TELEMEDICINE (OUTPATIENT)
Dept: PSYCHIATRY | Facility: CLINIC | Age: 68
End: 2023-10-11
Payer: COMMERCIAL

## 2023-10-11 DIAGNOSIS — F43.10 POST TRAUMATIC STRESS DISORDER (PTSD): Primary | ICD-10-CM

## 2023-10-11 PROCEDURE — 90834 PSYTX W PT 45 MINUTES: CPT | Performed by: STUDENT IN AN ORGANIZED HEALTH CARE EDUCATION/TRAINING PROGRAM

## 2023-10-11 NOTE — BH TREATMENT PLAN
Outpatient Behavioral Health Psychotherapy Treatment Plan    Lucy Pinzon  1955     Date of Initial Psychotherapy Assessment: 3/13/23   Date of Current Treatment Plan: 10/11/23  Treatment Plan Target Date: 4/11/23  Treatment Plan Expiration Date: 4/1123    Diagnosis:   1. Post traumatic stress disorder (PTSD)            Area(s) of Need: I want to be more consistent in working out. Long Term Goal 1 (in the client's own words): I would like to be healthier and feel better about myself. And maybe if things go better manjinder my surgery for my parkinson. Stage of Change: Action    Target Date for completion: 4/11/24     Anticipated therapeutic modalities: CBT and client centered     People identified to complete this goal: Kael Pfeiffer and Helena Romero LCSW      Objective 1: (identify the means of measuring success in meeting the objective): "Kevin Wiley will practice and implement CBT and MBSR strategies to identify thoughts and behaviors that prevent her from being consistent on her physical activity"      Objective 2: (identify the means of measuring success in meeting the objective): n/a      Long Term Goal 2 (in the client's own words): "I would like to continue healing from previous traumatic events"    Stage of Change: Action    Target Date for completion: 4/11/24     Anticipated therapeutic modalities: MBSR and CBT     People identified to complete this goal: Kevin Pfeiffer and Helena Romero LCSW      Objective 1: (identify the means of measuring success in meeting the objective): "Kevin Wiley will implement positive coping skills to manage her feelings and thoughts of anxiety and depression"      Objective 2: (identify the means of measuring success in meeting the objective): n/a     Long Term Goal 3 (in the client's own words): n/a    Stage of Change: n/a    Target Date for completion: n/a     Anticipated therapeutic modalities: n/a     People identified to complete this goal: n/a      Objective 1: (identify the means of measuring success in meeting the objective): n/a      Objective 2: (identify the means of measuring success in meeting the objective): n/a     I am currently under the care of a [facility] psychiatric provider: N/A    My [facility] psychiatric provider is: N/A    I am currently taking psychiatric medications: Yes, as prescribed    I feel that I will be ready for discharge from mental health care when I reach the following (measurable goal/objective): "When I find that peace that I was looking for. I am feeling more now that I ever had. I am learning to let that go". For children and adults who have a legal guardian:   Has there been any change to custody orders and/or guardianship status? NA. If yes, attach updated documentation. I have created my Crisis Plan and have been offered a copy of this plan    [address]: Diagnosis and Treatment Plan explained to Rinaldo Meigs acknowledges an understanding of their diagnosis. Prince Mendosa agrees to this treatment plan.     I have been offered a copy of this Treatment Plan. yes

## 2023-10-11 NOTE — PSYCH
Virtual Brief Visit    This Visit is being completed by telephone. The Patient is located at Home and in the following state in which I hold an active license [place]    The patient was identified by name and date of birth. Va Carlos was informed that this is a telemedicine visit and that the visit is being conducted through the [employer]. She agrees to proceed. .  My office door was closed. No one else was in the room. She acknowledged consent and understanding of privacy and security of the video platform. The patient has agreed to participate and understands they can discontinue the visit at any time. Patient is aware this is a billable service. Assessment/Plan:    Problem List Items Addressed This Visit          Other    Post traumatic stress disorder (PTSD) - Primary       Recent Visits  No visits were found meeting these conditions. Showing recent visits within past 7 days and meeting all other requirements  Today's Visits  Date Type Provider Dept   10/11/23 Telemedicine Daily Romero LCSW [facility]   Showing today's visits and meeting all other requirements  Future Appointments  No visits were found meeting these conditions. Showing future appointments within next 150 days and meeting all other requirements     Behavioral Health Psychotherapy Progress Note    Psychotherapy Provided: Individual Psychotherapy     1. Post traumatic stress disorder (PTSD)            Goals addressed in session: Goal 1     DATA: Ramesh Negrete continues to make great progress at managing her feelings of anxiety. Ramesh Negrete completed her treatment plan. During this session, this clinician used the following therapeutic modalities: Client-centered Therapy and Cognitive Behavioral Therapy    Substance Abuse was addressed during this session. If the client is diagnosed with a co-occurring substance use disorder, please indicate any changes in the frequency or amount of use: n/a. Stage of change for addressing substance use diagnoses: No substance use/Not applicable    ASSESSMENT:  Stefanie Cadena presents with a Euthymic/ normal mood. her affect is Normal range and intensity, which is congruent, with her mood and the content of the session. The client has made progress on their goals. Stefanie Cadena presents with a none risk of suicide, none risk of self-harm, and none risk of harm to others. For any risk assessment that surpasses a "low" rating, a safety plan must be developed. A safety plan was indicated: no  If yes, describe in detail n/a    PLAN: Between sessions, Stefanie Cadena will implement positive coping skills. At the next session, the therapist will use Client-centered Therapy to address PTSD symptoms. Behavioral Health Treatment Plan and Discharge Planning: Stefanie Cadena is aware of and agrees to continue to work on their treatment plan. They have identified and are working toward their discharge goals.  no    Visit start and stop times:    10/11/23  Start Time: 1300  Stop Time: 1342  Total Visit Time: 42 minutes

## 2023-10-25 ENCOUNTER — TELEMEDICINE (OUTPATIENT)
Dept: PSYCHIATRY | Facility: CLINIC | Age: 68
End: 2023-10-25
Payer: COMMERCIAL

## 2023-10-25 DIAGNOSIS — F43.10 POST TRAUMATIC STRESS DISORDER (PTSD): Primary | ICD-10-CM

## 2023-10-25 PROCEDURE — 90834 PSYTX W PT 45 MINUTES: CPT | Performed by: STUDENT IN AN ORGANIZED HEALTH CARE EDUCATION/TRAINING PROGRAM

## 2023-10-25 NOTE — PSYCH
Virtual Regular Visit    Verification of patient location:    Patient is located at Home in the following state in which I hold an active license [place]      Assessment/Plan:    Problem List Items Addressed This Visit          Other    Post traumatic stress disorder (PTSD) - Primary       Goals addressed in session: Goal 1          Reason for visit is No chief complaint on file. Encounter provider Yuliana Martines LCSW    Provider located at [address] [place] [id number] [phone]      Recent Visits  No visits were found meeting these conditions. Showing recent visits within past 7 days and meeting all other requirements  Today's Visits  Date Type Provider Dept   10/25/23 Telemedicine Kasi Backers [facility]   Showing today's visits and meeting all other requirements  Future Appointments  No visits were found meeting these conditions. Showing future appointments within next 150 days and meeting all other requirements       The patient was identified by name and date of birth. Sultana Acevedo was informed that this is a telemedicine visit and [facility] She agrees to proceed. .  My office door was closed. No one else was in the room. She acknowledged consent and understanding of privacy and security of the video platform. The patient has agreed to participate and understands they can discontinue the visit at any time. Patient is aware this is a billable service. Subjective  Sultana Acevedo is a 78 y.o. female  . HPI     No past medical history on file. No past surgical history on file. No current outpatient medications on file. No current facility-administered medications for this visit.         Not on File    Review of Systems    Video Exam    There were no vitals filed for this visit.    Physical Exam     Behavioral Health Psychotherapy Progress Note    Psychotherapy Provided: Individual Psychotherapy     1. Post traumatic stress disorder (PTSD)            Goals addressed in session: Goal 1     DATA: Daniel Yang continues to make great progress at implementing MBSR strategies to manage her emotions. She is focusing in her present and allowing herself to experience her feelings. Daniel Yang processed some of previous traumatic experiences. She has a growth mindset. During this session, this clinician used the following therapeutic modalities: Mindfulness-based Strategies    Substance Abuse was not addressed during this session. If the client is diagnosed with a co-occurring substance use disorder, please indicate any changes in the frequency or amount of use: N/A. Stage of change for addressing substance use diagnoses: No substance use/Not applicable    ASSESSMENT:  Lio Beltran presents with a Euthymic/ normal mood. her affect is Normal range and intensity, which is congruent, with her mood and the content of the session. The client has made progress on their goals. Lio Beltran presents with a none risk of suicide, none risk of self-harm, and none risk of harm to others. For any risk assessment that surpasses a "low" rating, a safety plan must be developed. A safety plan was indicated: no  If yes, describe in detail n/a    PLAN: Between sessions, Lio Beltran will continue to implement positive coping skills. At the next session, the therapist will use Cognitive Behavioral Therapy and Mindfulness-based Strategies to address PTSD symtpoms. Behavioral Health Treatment Plan and Discharge Planning: Lio Beltran is aware of and agrees to continue to work on their treatment plan. They have identified and are working toward their discharge goals.  no    Visit start and stop times:    10/25/23  Start Time: 1300  Stop Time: 1352  Total Visit Time: 46 minutes

## 2023-11-08 ENCOUNTER — TELEMEDICINE (OUTPATIENT)
Dept: PSYCHIATRY | Facility: CLINIC | Age: 68
End: 2023-11-08
Payer: COMMERCIAL

## 2023-11-08 DIAGNOSIS — F43.10 POST TRAUMATIC STRESS DISORDER (PTSD): Primary | ICD-10-CM

## 2023-11-08 PROCEDURE — 90837 PSYTX W PT 60 MINUTES: CPT | Performed by: STUDENT IN AN ORGANIZED HEALTH CARE EDUCATION/TRAINING PROGRAM

## 2023-11-08 NOTE — PSYCH
Virtual Regular Visit    Verification of patient location:    Patient is located at Home in the following state in which I hold an active license [place]      Assessment/Plan:    Problem List Items Addressed This Visit          Other    Post traumatic stress disorder (PTSD) - Primary       Goals addressed in session: Goal 1          Reason for visit is No chief complaint on file. Encounter provider Rae Cardenas LCSW    Provider located at [address] [place] [id number] [phone]      Recent Visits  No visits were found meeting these conditions. Showing recent visits within past 7 days and meeting all other requirements  Today's Visits  Date Type Provider Dept   11/08/23 Telemedicine Leila Romero LCSW [facility]   Showing today's visits and meeting all other requirements  Future Appointments  No visits were found meeting these conditions. Showing future appointments within next 150 days and meeting all other requirements       The patient was identified by name and date of birth. Sandra Fernández was informed that this is a telemedicine visit and [facility] She agrees to proceed. .  My office door was closed. No one else was in the room. She acknowledged consent and understanding of privacy and security of the video platform. The patient has agreed to participate and understands they can discontinue the visit at any time. Patient is aware this is a billable service. Subjective  Sandra Fernández is a 78 y.o. female  . HPI     No past medical history on file. No past surgical history on file. No current outpatient medications on file. No current facility-administered medications for this visit.         Not on File    Review of Systems    Video Exam    There were no vitals filed for this visit.    Physical Exam     Behavioral Health Psychotherapy Progress Note    Psychotherapy Provided: Individual Psychotherapy     1. Post traumatic stress disorder (PTSD)            Goals addressed in session: Goal 1     DATA: Mayank Bowen processed her feelings and thoughts about her physical activity. She is increase ing her exercises however, she got hurt and had to take a break from walking. Fei Diaz practiced mindfulness meditation about forgiveness and process one traumatic event. She is working on the principal of letting go. During this session, this clinician used the following therapeutic modalities: Client-centered Therapy and Mindfulness-based Strategies    Substance Abuse was not addressed during this session. If the client is diagnosed with a co-occurring substance use disorder, please indicate any changes in the frequency or amount of use: n/a. Stage of change for addressing substance use diagnoses: No substance use/Not applicable    ASSESSMENT:  Yoselin Clark presents with a Euthymic/ normal mood. her affect is Normal range and intensity, which is congruent, with her mood and the content of the session. The client has made progress on their goals. Yoselin Clark presents with a none risk of suicide, none risk of self-harm, and none risk of harm to others. For any risk assessment that surpasses a "low" rating, a safety plan must be developed. A safety plan was indicated: no  If yes, describe in detail n/a    PLAN: Between sessions, Yoselin Clark will continue to implement positive coping skills. At the next session, the therapist will use Client-centered Therapy and Cognitive Behavioral Therapy to address n/a. Behavioral Health Treatment Plan and Discharge Planning: Yoselin Clark is aware of and agrees to continue to work on their treatment plan. They have identified and are working toward their discharge goals.  no    Visit start and stop times:    11/08/23  Start Time: 1300  Stop Time: 1353  Total Visit Time: 53 minutes

## 2023-11-22 ENCOUNTER — TELEMEDICINE (OUTPATIENT)
Dept: PSYCHIATRY | Facility: CLINIC | Age: 68
End: 2023-11-22
Payer: COMMERCIAL

## 2023-11-22 DIAGNOSIS — F43.10 POST TRAUMATIC STRESS DISORDER (PTSD): Primary | ICD-10-CM

## 2023-11-22 PROCEDURE — 90834 PSYTX W PT 45 MINUTES: CPT | Performed by: STUDENT IN AN ORGANIZED HEALTH CARE EDUCATION/TRAINING PROGRAM

## 2023-11-22 NOTE — PSYCH
Virtual Regular Visit    Verification of patient location:    Patient is located at Home in the following state in which I hold an active license [place]      Assessment/Plan:    Problem List Items Addressed This Visit          Other    Post traumatic stress disorder (PTSD) - Primary       Goals addressed in session: Goal 1          Reason for visit is   Chief Complaint   Patient presents with    Virtual Regular Visit          Encounter provider Arley Padilla LCSW    Provider located at [address] [place] [id number] [phone]      Recent Visits  Date Type Provider Dept   11/22/23 [address] [facility] [address] [facility]   Showing recent visits within past 7 days and meeting all other requirements  Future Appointments  No visits were found meeting these conditions. Showing future appointments within next 150 days and meeting all other requirements       The patient was identified by name and date of birth. Burgess Hinojosa was informed that this is a telemedicine visit and that the visit is being conducted throught[facility] [employer]. She agrees to proceed. .  My office door was closed. No one else was in the room. She acknowledged consent and understanding of privacy and security of the video platform. The patient has agreed to participate and understands they can discontinue the visit at any time. Patient is aware this is a billable service. Subjective  Burgess Hinojosa is a 80 y.o. female  . HPI     No past medical history on file. No past surgical history on file. No current outpatient medications on file. No current facility-administered medications for this visit. Not on File    Review of Systems    Video Exam    There were no vitals filed for this visit.     Physical Exam     Behavioral Health Psychotherapy Progress Note    Psychotherapy Provided: Individual Psychotherapy     1. Post traumatic stress disorder (PTSD)            Goals addressed in session: Goal 1     DATA: Radha Correa talked about her holiday and her feelings about her upcoming birthday. Joanna Hilario reports feeling optimistic about this new year. She reported continue pain in her back, she is going to the chiropractor to address this issue. She reported feeling happy and excited about Thanksgiving and was able to identify positive events and memories. During this session, this clinician used the following therapeutic modalities: Client-centered Therapy    Substance Abuse was not addressed during this session. If the client is diagnosed with a co-occurring substance use disorder, please indicate any changes in the frequency or amount of use: n/a. Stage of change for addressing substance use diagnoses: No substance use/Not applicable    ASSESSMENT:  Yinka Petersen presents with a Euthymic/ normal mood. her affect is Normal range and intensity, which is congruent, with her mood and the content of the session. The client has made progress on their goals. Yinka Petersen presents with a none risk of suicide, none risk of self-harm, and none risk of harm to others. For any risk assessment that surpasses a "low" rating, a safety plan must be developed. A safety plan was indicated: no  If yes, describe in detail n/a    PLAN: Between sessions, Yinka Petersen will continue to practice CBT and MBSR strategies. At the next session, the therapist will use Client-centered Therapy to address ANXIETY and PTSD symptoms. Behavioral Health Treatment Plan and Discharge Planning: Yinka Petersen is aware of and agrees to continue to work on their treatment plan. They have identified and are working toward their discharge goals.  no    Visit start and stop times:    11/22/23  Start Time: 1302  Stop Time: 1345  Total Visit Time: 43 minutes

## 2023-12-06 ENCOUNTER — TELEMEDICINE (OUTPATIENT)
Dept: PSYCHIATRY | Facility: CLINIC | Age: 68
End: 2023-12-06
Payer: COMMERCIAL

## 2023-12-06 DIAGNOSIS — F43.10 POST TRAUMATIC STRESS DISORDER (PTSD): Primary | ICD-10-CM

## 2023-12-06 PROCEDURE — 90834 PSYTX W PT 45 MINUTES: CPT | Performed by: STUDENT IN AN ORGANIZED HEALTH CARE EDUCATION/TRAINING PROGRAM

## 2023-12-06 NOTE — PSYCH
Virtual Regular Visit    Verification of patient location:    Patient is located at Home in the following state in which I hold an [place] license [place]      Assessment/Plan:    Problem List Items Addressed This Visit          Other    Post traumatic stress disorder (PTSD) - Primary       Goals addressed in session: Goal 1          Reason for visit is No chief complaint on file. Encounter provider Maria Damon LCSW    Provider located at [address] [place] [id number]  [phone]      Recent Visits  No visits were found meeting these conditions. Showing recent visits within past 7 days and meeting all other requirements  Today's Visits  Date Type Provider Dept   12/06/23 Telemedicine Zuly Romero LCSW [facility]   Showing today's visits and meeting all other requirements  Future Appointments  No visits were found meeting these conditions. Showing future appointments within next 150 days and meeting all other requirements       The patient was identified by name and date of birth. Huong Jimenez was informed that this is a telemedicine visit and [facility] She agrees to proceed. .  My office door was closed. No one else was in the room. She acknowledged consent and understanding of privacy and security of the video platform. The patient has agreed to participate and understands they can discontinue the visit at any time. Patient is aware this is a billable service. Subjective  Huong Jimenez is a 80 y.o. female  . HPI     No past medical history on file. No past surgical history on file. No current outpatient medications on file. No current facility-administered medications for this visit.         Not on File    Review of Systems    Video Exam    There were no vitals filed for this visit.    Physical Exam     Behavioral Health Psychotherapy Progress Note    Psychotherapy Provided: Individual Psychotherapy     1. Post traumatic stress disorder (PTSD)            Goals addressed in session: Goal 1     DATA: Larisa Fierro reported feeling frustrated with herself after not being able to work our more consistently as she would like to. Larisa Fierro is implementing strategies to manage her feelings of frustration and to stay on track of her goal of working out consistently. During this session, this clinician used the following therapeutic modalities: Client-centered Therapy    Substance Abuse was addressed during this session. If the client is diagnosed with a co-occurring substance use disorder, please indicate any changes in the frequency or amount of use: n/a. Stage of change for addressing substance use diagnoses: No substance use/Not applicable    ASSESSMENT:  Rose Marie Starr presents with a Anxious mood. her affect is Normal range and intensity, which is congruent, with her mood and the content of the session. The client has made progress on their goals. Rose Marie Starr presents with a none risk of suicide, none risk of self-harm, and none risk of harm to others. For any risk assessment that surpasses a "low" rating, a safety plan must be developed. A safety plan was indicated: no  If yes, describe in detail n/a    PLAN: Between sessions, Rose Marie Starr will continue to monitor her progress. At the next session, the therapist will use Client-centered Therapy to address anxiety. Behavioral Health Treatment Plan and Discharge Planning: Rose Marie Starr is aware of and agrees to continue to work on their treatment plan. They have identified and are working toward their discharge goals.  no    Visit start and stop times:    12/06/23  Start Time: 1300  Stop Time: 1347  Total Visit Time: 47 minutes

## 2023-12-20 ENCOUNTER — TELEMEDICINE (OUTPATIENT)
Dept: PSYCHIATRY | Facility: CLINIC | Age: 68
End: 2023-12-20
Payer: COMMERCIAL

## 2023-12-20 DIAGNOSIS — F43.10 POST TRAUMATIC STRESS DISORDER (PTSD): Primary | ICD-10-CM

## 2023-12-20 PROCEDURE — 90837 PSYTX W PT 60 MINUTES: CPT | Performed by: STUDENT IN AN ORGANIZED HEALTH CARE EDUCATION/TRAINING PROGRAM

## 2023-12-20 NOTE — PSYCH
Virtual Regular Visit    Verification of patient location:    Patient is located at Home in the following state in which I hold an active license [place]      Assessment/Plan:    Problem List Items Addressed This Visit          Other    Post traumatic stress disorder (PTSD) - Primary       Goals addressed in session: Goal 1          Reason for visit is No chief complaint on file.       Encounter provider Helena Romero LCSW    Provider located at [place] [address]  [place] [address] [phone]      Recent Visits  No visits were found meeting these conditions.  Showing recent visits within past 7 days and meeting all other requirements  Today's Visits  Date Type Provider Dept   12/20/23 Telemedicine Helena Romero LCSW  [facility]   Showing today's visits and meeting all other requirements  Future Appointments  No visits were found meeting these conditions.  Showing future appointments within next 150 days and meeting all other requirements       The patient was identified by name and date of birth. Candie Pfeiffer was informed that this is a telemedicine visit and that the visit is being conducted throughthe [employer] Emb[employer] platform. She agrees to proceed..  My office door was closed. No one else was in the room.  She acknowledged consent and understanding of privacy and security of the video platform. The patient has agreed to participate and understands they can discontinue the visit at any time.    Patient is aware this is a billable service.     Subjective  Candie Pfeiffer is a 68 y.o. female  .      HPI     No past medical history on file.    No past surgical history on file.    No current outpatient medications on file.     No current facility-administered medications for this visit.        Not on File    Review of Systems    Video Exam    There were no vitals filed for this  "visit.    Physical Exam     Behavioral Health Psychotherapy Progress Note    Psychotherapy Provided: Individual Psychotherapy     1. Post traumatic stress disorder (PTSD)            Goals addressed in session: Goal 1     DATA: Candie processed her feelings of anxiety and frustration for not being able to get on a routine of working out. She talked about her symptoms of fatigue and pain. She is does not want to take more medication to address her sleeping, but is willing to do alternative tools. During this session, termination of services was discussed as Candie has set this time a few months ago. She reported that she would like to continue with therapy session.   During this session, this clinician used the following therapeutic modalities: Client-centered Therapy    Substance Abuse was not addressed during this session. If the client is diagnosed with a co-occurring substance use disorder, please indicate any changes in the frequency or amount of use: n/a. Stage of change for addressing substance use diagnoses: No substance use/Not applicable    ASSESSMENT:  Candie Pfeiffer presents with a Anxious mood.     her affect is Normal range and intensity, which is congruent, with her mood and the content of the session. The client has made progress on their goals.     Candie Pfeiffer presents with a none risk of suicide, none risk of self-harm, and none risk of harm to others.    For any risk assessment that surpasses a \"low\" rating, a safety plan must be developed.    A safety plan was indicated: no  If yes, describe in detail n/a    PLAN: Between sessions, Candie Pfeiffer will continue to implement positive coping skills. At the next session, the therapist will use Client-centered Therapy to address anxiety and PTSD.    Behavioral Health Treatment Plan and Discharge Planning: Candie Pfeiffer is aware of and agrees to continue to work on their treatment plan. They have identified and are working toward " their discharge goals. no    Visit start and stop times:    12/20/23  Start Time: 1300  Stop Time: 1354  Total Visit Time: 54 minutes

## 2024-01-03 ENCOUNTER — TELEMEDICINE (OUTPATIENT)
Dept: PSYCHIATRY | Facility: CLINIC | Age: 69
End: 2024-01-03
Payer: COMMERCIAL

## 2024-01-03 DIAGNOSIS — F43.10 POST TRAUMATIC STRESS DISORDER (PTSD): Primary | ICD-10-CM

## 2024-01-03 PROCEDURE — 90837 PSYTX W PT 60 MINUTES: CPT | Performed by: STUDENT IN AN ORGANIZED HEALTH CARE EDUCATION/TRAINING PROGRAM

## 2024-01-03 NOTE — PSYCH
Virtual Regular Visit    Verification of patient location:    Patient is located at Home in the following state in which I hold an active license [place]      Assessment/Plan:    Problem List Items Addressed This Visit          Other    Post traumatic stress disorder (PTSD) - Primary       Goals addressed in session: Goal 1          Reason for visit is No chief complaint on file.       Encounter provider Helena Romero LCSW    Provider located at [place] [address]  [place] [address] [phone]      Recent Visits  No visits were found meeting these conditions.  Showing recent visits within past 7 days and meeting all other requirements  Today's Visits  Date Type Provider Dept   01/03/24 Telemedicine Helena Romero LCSW  [facility]   Showing today's visits and meeting all other requirements  Future Appointments  No visits were found meeting these conditions.  Showing future appointments within next 150 days and meeting all other requirements       The patient was identified by name and date of birth. Candie Pfeiffer was informed that this is a telemedicine visit and that the visit is being conducted throughthe [employer] Emb[employer] platform. She agrees to proceed..  My office door was closed. No one else was in the room.  She acknowledged consent and understanding of privacy and security of the video platform. The patient has agreed to participate and understands they can discontinue the visit at any time.    Patient is aware this is a billable service.     Subjective  Candie Pfeiffer is a 68 y.o. female  .      HPI     No past medical history on file.    No past surgical history on file.    No current outpatient medications on file.     No current facility-administered medications for this visit.        Not on File    Review of Systems    Video Exam    There were no vitals filed for this  "visit.    Physical Exam     Behavioral Health Psychotherapy Progress Note    Psychotherapy Provided: Individual Psychotherapy     1. Post traumatic stress disorder (PTSD)            Goals addressed in session: Goal 1     DATA: Candie processed her feelings of anxiety. She was able to reflected on her behavior or not exercising. She believes she is sabotaging her surgery because of fear of being a burden to her children.   During this session, this clinician used the following therapeutic modalities: Client-centered Therapy    Substance Abuse was not addressed during this session. If the client is diagnosed with a co-occurring substance use disorder, please indicate any changes in the frequency or amount of use: n/a. Stage of change for addressing substance use diagnoses: No substance use/Not applicable    ASSESSMENT:  Candie Pfeiffer presents with a Euthymic/ normal mood.     her affect is Normal range and intensity, which is congruent, with her mood and the content of the session. The client has made progress on their goals.     Candie Pfeiffer presents with a none risk of suicide, none risk of self-harm, and none risk of harm to others.    For any risk assessment that surpasses a \"low\" rating, a safety plan must be developed.    A safety plan was indicated: no  If yes, describe in detail n/a    PLAN: Between sessions, Candie Pfeiffer will continue to reflect on her thoughts and behavior. At the next session, the therapist will use Client-centered Therapy to address anxiety and PTSD.    Behavioral Health Treatment Plan and Discharge Planning: Candie Pfeiffer is aware of and agrees to continue to work on their treatment plan. They have identified and are working toward their discharge goals. no    Visit start and stop times:    01/03/24  Start Time: 1301  Stop Time: 1359  Total Visit Time: 58 minutes        "

## 2024-01-17 ENCOUNTER — TELEMEDICINE (OUTPATIENT)
Dept: PSYCHIATRY | Facility: CLINIC | Age: 69
End: 2024-01-17
Payer: COMMERCIAL

## 2024-01-17 DIAGNOSIS — F43.10 POST TRAUMATIC STRESS DISORDER (PTSD): Primary | ICD-10-CM

## 2024-01-17 PROCEDURE — 90837 PSYTX W PT 60 MINUTES: CPT | Performed by: STUDENT IN AN ORGANIZED HEALTH CARE EDUCATION/TRAINING PROGRAM

## 2024-01-17 NOTE — PSYCH
Virtual Brief Visit    This Visit is being completed by telephone. The Patient is located at Home and in the following state in which I hold an active license [place]    The patient was identified by name and date of birth. Candie Pfeiffer was informed that this is a telemedicine visit and that the visit is being conducted through the Epic Embedded platform. She agrees to proceed..  My office door was closed. No one else was in the room.  She acknowledged consent and understanding of privacy and security of the video platform. The patient has agreed to participate and understands they can discontinue the visit at any time.    Patient is aware this is a billable service.       Assessment/Plan:    Problem List Items Addressed This Visit          Other    Post traumatic stress disorder (PTSD) - Primary       Recent Visits  No visits were found meeting these conditions.  Showing recent visits within past 7 days and meeting all other requirements  Today's Visits  Date Type Provider Dept   01/17/24 Telemedicine Helena Romero LCSW [facility]   Showing today's visits and meeting all other requirements  Future Appointments  No visits were found meeting these conditions.  Showing future appointments within next 150 days and meeting all other requirements       Behavioral Health Psychotherapy Progress Note    Psychotherapy Provided: Individual Psychotherapy     1. Post traumatic stress disorder (PTSD)            Goals addressed in session: Goal 1     DATA: Candie processed her feeling of frustration with not being able to stay motivated and getting hurt. She would like to be able to get back to the routine of walking 3 times a week. She reports struggling to stay motivated and missing having a friend to go with. She explored different options and was unable to find a solution.   During this session, this clinician used the following therapeutic modalities: Client-centered Therapy    Substance  "Abuse was not addressed during this session. If the client is diagnosed with a co-occurring substance use disorder, please indicate any changes in the frequency or amount of use: n/a. Stage of change for addressing substance use diagnoses: No substance use/Not applicable    ASSESSMENT:  Candie Pfeiffer presents with a Euthymic/ normal mood.     her affect is Normal range and intensity, which is congruent, with her mood and the content of the session. The client has made progress on their goals.     Candie Pfeiffer presents with a none risk of suicide, none risk of self-harm, and none risk of harm to others.    For any risk assessment that surpasses a \"low\" rating, a safety plan must be developed.    A safety plan was indicated: no  If yes, describe in detail n/a    PLAN: Between sessions, Candie Pfeiffer will continue to implement positive coping skills. At the next session, the therapist will use Client-centered Therapy to address PTSD.    Behavioral Health Treatment Plan and Discharge Planning: Candie Pfeiffer is aware of and agrees to continue to work on their treatment plan. They have identified and are working toward their discharge goals. no    Visit start and stop times:    01/17/24  Start Time: 1300  Stop Time: 1355  Total Visit Time: 55 minutes            "

## 2024-01-31 ENCOUNTER — TELEMEDICINE (OUTPATIENT)
Dept: PSYCHIATRY | Facility: CLINIC | Age: 69
End: 2024-01-31
Payer: COMMERCIAL

## 2024-01-31 DIAGNOSIS — F43.10 POST TRAUMATIC STRESS DISORDER (PTSD): Primary | ICD-10-CM

## 2024-01-31 PROCEDURE — 90834 PSYTX W PT 45 MINUTES: CPT | Performed by: STUDENT IN AN ORGANIZED HEALTH CARE EDUCATION/TRAINING PROGRAM

## 2024-01-31 NOTE — PSYCH
Virtual Regular Visit    Verification of patient location:    Patient is located at Home in the following state in which I hold an active license [place]      Assessment/Plan:    Problem List Items Addressed This Visit       Post traumatic stress disorder (PTSD) - Primary       Goals addressed in session: Goal 1          Reason for visit is No chief complaint on file.       Encounter provider Helena Romero LCSW    [facility] [address]  [place] [address] [phone]      Recent Visits  Date Type Provider Dept   01/31/24 Telemedicine [facility]ero LCSW [facility]   Showing recent visits within past 7 days and meeting all other requirements  Future Appointments  No visits were found meeting these conditions.  Showing future appointments within next 150 days and meeting all other requirements       The patient was identified by name and date of birth. Candie Pfeiffer was informed that this is a telemedicine visit and that the visit is being conducted throughthe Epic Embedded platform. She agrees to proceed..  My office door was closed. No one else was in the room.  She acknowledged consent and understanding of privacy and security of the video platform. The patient has agreed to participate and understands they can discontinue the visit at any time.    Patient is aware this is a billable service.     Subjective  Candie Pfeiffer is a 68 y.o. female  .      HPI     No past medical history on file.    No past surgical history on file.    No current outpatient medications on file.     No current facility-administered medications for this visit.        Not on File    Review of Systems    Video Exam    There were no vitals filed for this visit.    Physical Exam     Behavioral Health Psychotherapy Progress Note    Psychotherapy Provided: Individual Psychotherapy     1.  "Post traumatic stress disorder (PTSD)            Goals addressed in session: Goal 1     DATA: Candie processed her feelings of frustration, anxiety and disappointment. She explore positive coping skills and talked about friendships that bring more stress in her life. Helena processed her feelings of fear about her surgery.   During this session, this clinician used the following therapeutic modalities: Client-centered Therapy    Substance Abuse was not addressed during this session. If the client is diagnosed with a co-occurring substance use disorder, please indicate any changes in the frequency or amount of use: n/a. Stage of change for addressing substance use diagnoses: No substance use/Not applicable    ASSESSMENT:  Candie Pfeiffer presents with a Anxious mood.     her affect is Normal range and intensity, which is congruent, with her mood and the content of the session. The client has made progress on their goals.     Candie Pfeiffer presents with a none risk of suicide, none risk of self-harm, and none risk of harm to others.    For any risk assessment that surpasses a \"low\" rating, a safety plan must be developed.    A safety plan was indicated: no  If yes, describe in detail n/a    PLAN: Between sessions, Candie Pfeiffer will continue to implement positive coping skills. At the next session, the therapist will use Cognitive Behavioral Therapy to address anxiety.    Behavioral Health Treatment Plan and Discharge Planning: Candie Pfeiffer is aware of and agrees to continue to work on their treatment plan. They have identified and are working toward their discharge goals. no    Visit start and stop times:    01/31/24  Start Time: 1300  Stop Time: 1345  Total Visit Time: 45 minutes        "

## 2024-02-21 ENCOUNTER — TELEMEDICINE (OUTPATIENT)
Dept: PSYCHIATRY | Facility: CLINIC | Age: 69
End: 2024-02-21
Payer: COMMERCIAL

## 2024-02-21 DIAGNOSIS — F43.10 POST TRAUMATIC STRESS DISORDER (PTSD): Primary | ICD-10-CM

## 2024-02-21 PROCEDURE — 90837 PSYTX W PT 60 MINUTES: CPT | Performed by: STUDENT IN AN ORGANIZED HEALTH CARE EDUCATION/TRAINING PROGRAM

## 2024-02-21 NOTE — PSYCH
Virtual Regular Visit    Verification of patient location:    Patient is located at Home in the following state in which I hold an active license [place]      Assessment/Plan:    Problem List Items Addressed This Visit       Post traumatic stress disorder (PTSD) - Primary       Goals addressed in session: Goal 1          Reason for visit is No chief complaint on file.       Encounter provider Helena Romero LCSW    Provider located [facility] [place] [address] [address] [place] [address] [phone]      Recent Visits  No visits were found meeting these conditions.  Showing recent visits within past 7 days and meeting all other requirements  Today's Visits  Date Type Provider Dept   02/21/24 Telemedicine Helena Romero LCSW  [facility]   Showing today's visits and meeting all other requirements  Future Appointments  No visits were found meeting these conditions.  Showing future appointments within next 150 days and meeting all other requirements       The patient was identified by name and date of birth. Candie Pfeiffer was informed that this is a telemedicine visit and that the visit is being conducted throughthe Epic Embedded platform. She agrees to proceed..  My office door was closed. No one else was in the room.  She acknowledged consent and understanding of privacy and security of the video platform. The patient has agreed to participate and understands they can discontinue the visit at any time.    Patient is aware this is a billable service.     Subjective  Candie Pfeiffer is a 68 y.o. female  .      HPI     No past medical history on file.    No past surgical history on file.    No current outpatient medications on file.     No current facility-administered medications for this visit.        Not on File    Review of Systems    Video Exam    There were no vitals filed for this  "visit.    Physical Exam     Behavioral Health Psychotherapy Progress Note    Psychotherapy Provided: Individual Psychotherapy     1. Post traumatic stress disorder (PTSD)            Goals addressed in session: Goal 1     DATA: Helena processed her feelings of frustration with her car [employer]. She is able to implement positive coping skills, and assertive communication when necessary. Solo also processed her feelings of abandonment, and neglect from her childhood. She made significant connections about her current behavior. She has made a decision to follow through her brain surgery.   During this session, this clinician used the following therapeutic modalities: Client-centered Therapy    Substance Abuse was not addressed during this session. If the client is diagnosed with a co-occurring substance use disorder, please indicate any changes in the frequency or amount of use: n/a. Stage of change for addressing substance use diagnoses: No substance use/Not applicable    ASSESSMENT:  Candie Pfeiffer presents with a Euthymic/ normal mood.     her affect is Normal range and intensity, which is congruent, with her mood and the content of the session. The client has made progress on their goals.     Candie Pfeiffer presents with a none risk of suicide, none risk of self-harm, and none risk of harm to others.    For any risk assessment that surpasses a \"low\" rating, a safety plan must be developed.    A safety plan was indicated: no  If yes, describe in detail n/a    PLAN: Between sessions, Candie Pfeiffer will continue to manage her feelings by implementing positive coping skills. At the next session, the therapist will use Cognitive Behavioral Therapy to address anxiety.    Behavioral Health Treatment Plan and Discharge Planning: Candie Pfeiffer is aware of and agrees to continue to work on their treatment plan. They have identified and are working toward their discharge goals. no    Visit start and stop " times:    02/21/24  Start Time: 1300  Stop Time: 1358  Total Visit Time: 58 minutes

## 2024-03-06 ENCOUNTER — TELEMEDICINE (OUTPATIENT)
Dept: PSYCHIATRY | Facility: CLINIC | Age: 69
End: 2024-03-06
Payer: COMMERCIAL

## 2024-03-06 DIAGNOSIS — F43.10 POST TRAUMATIC STRESS DISORDER (PTSD): Primary | ICD-10-CM

## 2024-03-06 PROCEDURE — 90837 PSYTX W PT 60 MINUTES: CPT | Performed by: STUDENT IN AN ORGANIZED HEALTH CARE EDUCATION/TRAINING PROGRAM

## 2024-03-06 NOTE — PSYCH
Virtual Regular Visit    Verification of patient location:    Patient is located at Home in the following state in which I hold an active license [place]      Assessment/Plan:    Problem List Items Addressed This Visit       Post traumatic stress disorder (PTSD) - Primary       Goals addressed in session: Goal 1          Reason for visit is No chief complaint on file.       Encounter provider Helena Romero LCSW    [facility] [place] [address]  [place] [address] [phone]      Recent Visits  Date Type Provider Dept   03/06/24 Telemedicine [facility] LCSW [facility]   Showing recent visits within past 7 days and meeting all other requirements  Future Appointments  No visits were found meeting these conditions.  Showing future appointments within next 150 days and meeting all other requirements       The patient was identified by name and date of birth. Candie Pfeiffer was informed that this is a telemedicine visit and that the visit is being conducted throughthe Epic Embedded platform. She agrees to proceed..  My office door was closed. No one else was in the room.  She acknowledged consent and understanding of privacy and security of the video platform. The patient has agreed to participate and understands they can discontinue the visit at any time.    Patient is aware this is a billable service.     Subjective  Candie Pfeiffer is a 68 y.o. female  .      HPI     No past medical history on file.    No past surgical history on file.    No current outpatient medications on file.     No current facility-administered medications for this visit.        Not on File    Review of Systems    Video Exam    There were no vitals filed for this visit.    Physical Exam     Behavioral Health Psychotherapy Progress Note    Psychotherapy Provided: Individual Psychotherapy     1.  "Post traumatic stress disorder (PTSD)            Goals addressed in session: Goal 1     DATA: Candie processed her feelings of anxiety, fear, and frustration. She has decided to have her brain surgery. She can identify triggers of her feelings and implement positive coping skills. She is using assertive communication, positive affirmations, and some breathing strategies.   During this session, this clinician used the following therapeutic modalities: Client-centered Therapy    Substance Abuse was not addressed during this session. If the client is diagnosed with a co-occurring substance use disorder, please indicate any changes in the frequency or amount of use: n/a. Stage of change for addressing substance use diagnoses: No substance use/Not applicable    ASSESSMENT:  Candie Pfeiffer presents with a Euthymic/ normal mood.     her affect is Normal range and intensity, which is congruent, with her mood and the content of the session. The client has made progress on their goals.     Candie Pfeiffer presents with a none risk of suicide, none risk of self-harm, and none risk of harm to others.    For any risk assessment that surpasses a \"low\" rating, a safety plan must be developed.    A safety plan was indicated: no  If yes, describe in detail n/a    PLAN: Between sessions, Candie Pfeiffer will continue to implement positive coping skills. At the next session, the therapist will use Client-centered Therapy to address anxiety and PTSD.    Behavioral Health Treatment Plan and Discharge Planning: Candie Pfeiffer is aware of and agrees to continue to work on their treatment plan. They have identified and are working toward their discharge goals. no    Visit start and stop times:    03/06/24  Start Time: 1301  Stop Time: 1357  Total Visit Time: 56 minutes        "

## 2024-03-18 NOTE — TELEPHONE ENCOUNTER
Pt called stating she was having some trouble getting into meeting with therapist  I gave pt Kylie's number and also sent a teams message to therapist to inform them on the trouble  [FreeTextEntry1] : BENJA OWENS is a 59 year female presenting to the office today with history of pectus excavatum for which she had a reconstructive surgery for placement of custom sternal implant and silicone breast implants placed in 1986. She had subsequent revisions thereafter due to implant rupture. She as well has silicone in her axillary lymph nodes. Patient has since had her breast implants exchanged to saline. Patient presents today c/o breast asymmetry and pain to the right breast. Patient not sure of fill quantity but does know that they are 468 textured saline implants and have been recalled. She as well feels that her breast implants have become smaller then they originally were.  INTERVAL HISTORY: Patient presents today for presurgical discussion.  She is scheduled for removal of bilateral saline breast implants, upsizing and replacement of saline implants with bilateral capsulectomy on 7/15/24.

## 2024-03-20 ENCOUNTER — TELEMEDICINE (OUTPATIENT)
Dept: PSYCHIATRY | Facility: CLINIC | Age: 69
End: 2024-03-20
Payer: COMMERCIAL

## 2024-03-20 DIAGNOSIS — F43.10 POST TRAUMATIC STRESS DISORDER (PTSD): Primary | ICD-10-CM

## 2024-03-20 PROCEDURE — 90837 PSYTX W PT 60 MINUTES: CPT | Performed by: STUDENT IN AN ORGANIZED HEALTH CARE EDUCATION/TRAINING PROGRAM

## 2024-03-20 NOTE — PSYCH
Virtual Regular Visit    Verification of patient location:    Patient is located at Home in the following state in which I hold an active license [place]      Assessment/Plan:    Problem List Items Addressed This Visit       Post traumatic stress disorder (PTSD) - Primary       Goals addressed in session: Goal 1          Reason for visit is No chief complaint on file.       Encounter provider Helena Romero LCSW    Provider located [facility] [place] [address] [address] [facility] [address] [phone]      Recent Visits  No visits were found meeting these conditions.  Showing recent visits within past 7 days and meeting all other requirements  Today's Visits  Date Type Provider Dept   03/20/24 Telemedicine Helena Romero LCSW  [facility]   Showing today's visits and meeting all other requirements  Future Appointments  No visits were found meeting these conditions.  Showing future appointments within next 150 days and meeting all other requirements       The patient was identified by name and date of birth. Candie Pfeiffer was informed that this is a telemedicine visit and that the visit is being conducted throughthe Epic Embedded platform. She agrees to proceed..  My office door was closed. No one else was in the room.  She acknowledged consent and understanding of privacy and security of the video platform. The patient has agreed to participate and understands they can discontinue the visit at any time.    Patient is aware this is a billable service.     Subjective  Candie Pfeiffer is a 68 y.o. female  .      HPI     No past medical history on file.    No past surgical history on file.    No current outpatient medications on file.     No current facility-administered medications for this visit.        Not on File    Review of Systems    Video Exam    There were no vitals filed for this  "visit.    Physical Exam     Behavioral Health Psychotherapy Progress Note    Psychotherapy Provided: Individual Psychotherapy     1. Post traumatic stress disorder (PTSD)            Goals addressed in session: Goal 1     DATA: Processed her thoughts about her surgery. Candie processed previous traumatic experiences and learned additional coping skills to manage her feelings. Candie reported feeling afraid of the surgery but working on positive affirmations to cope with this feelings.   During this session, this clinician used the following therapeutic modalities: Client-centered Therapy and Cognitive Behavioral Therapy    Substance Abuse was not addressed during this session. If the client is diagnosed with a co-occurring substance use disorder, please indicate any changes in the frequency or amount of use: n/a. Stage of change for addressing substance use diagnoses: No substance use/Not applicable    ASSESSMENT:  Candie Pfeiffer presents with a Euthymic/ normal mood.     her affect is Normal range and intensity, which is congruent, with her mood and the content of the session. The client has made progress on their goals.     Candie Pfeiffer presents with a none risk of suicide, none risk of self-harm, and none risk of harm to others.    For any risk assessment that surpasses a \"low\" rating, a safety plan must be developed.    A safety plan was indicated: no  If yes, describe in detail n/a    PLAN: Between sessions, Candie Pfeiffer will continue to implement positive coping skills. At the next session, the therapist will use Client-centered Therapy to address anxiety and PTSD.    Behavioral Health Treatment Plan and Discharge Planning: Candie Pfeiffer is aware of and agrees to continue to work on their treatment plan. They have identified and are working toward their discharge goals. no    Visit start and stop times:    03/20/24  Start Time: 1300  Stop Time: 1356  Total Visit Time: 56 minutes        "

## 2024-04-09 ENCOUNTER — TELEMEDICINE (OUTPATIENT)
Dept: PSYCHIATRY | Facility: CLINIC | Age: 69
End: 2024-04-09

## 2024-04-09 DIAGNOSIS — F43.10 POST TRAUMATIC STRESS DISORDER (PTSD): Primary | ICD-10-CM

## 2024-04-09 NOTE — PSYCH
Virtual Regular Visit    Verification of patient location:    Patient is located at Home in the following state in which I hold an active license [place]      Assessment/Plan:    Problem List Items Addressed This Visit       Post traumatic stress disorder (PTSD) - Primary       Goals addressed in session: Goal 1          Reason for visit is No chief complaint on file.       Encounter provider Helena Romero LCSW    Provider located at [place] [address] [address] [place] [address] [phone]      Recent Visits  No visits were found meeting these conditions.  Showing recent visits within past 7 days and meeting all other requirements  Today's Visits  Date Type Provider Dept   04/09/24 Telemedicine Helena Romero LCSW  [facility]   Showing today's visits and meeting all other requirements  Future Appointments  No visits were found meeting these conditions.  Showing future appointments within next 150 days and meeting all other requirements       The patient was identified by name and date of birth. Candie Pfeiffer was informed that this is a telemedicine visit and that the visit is being conducted throughthe Epic Embedded platform. She agrees to proceed..  My office door was closed. No one else was in the room.  She acknowledged consent and understanding of privacy and security of the video platform. The patient has agreed to participate and understands they can discontinue the visit at any time.    Patient is aware this is a billable service.     Subjective  Candie Pfeiffer is a 68 y.o. female  .      HPI     No past medical history on file.    No past surgical history on file.    No current outpatient medications on file.     No current facility-administered medications for this visit.        Not on File    Review of Systems    Video Exam    There were no vitals filed for this  "visit.    Physical Exam     Behavioral Health Psychotherapy Progress Note    Psychotherapy Provided: Individual Psychotherapy     1. Post traumatic stress disorder (PTSD)            Goals addressed in session: Goal 1     DATA: Candie processed her feelings, and thoughts about starting physical therapy. She talked about setting the date for her surgery on July 18th. She expressed how her family took the news and how she feels about it. Candie is making progress coping with previous traumatic experiences and implement strategies to accept and let go. Candie reported feeling anxious/worried about all the things she was in her house and wanting to clean up so that her daughter does not have to deal with it when she passes away.   During this session, this clinician used the following therapeutic modalities: Client-centered Therapy    Substance Abuse was not addressed during this session. If the client is diagnosed with a co-occurring substance use disorder, please indicate any changes in the frequency or amount of use: n/a. Stage of change for addressing substance use diagnoses: No substance use/Not applicable    ASSESSMENT:  Candie Pfeiffer presents with a Anxious and Dysthymic mood.     her affect is Normal range and intensity, which is congruent, with her mood and the content of the session. The client has made progress on their goals.     Candie Pfeiffer presents with a none risk of suicide, none risk of self-harm, and none risk of harm to others.    For any risk assessment that surpasses a \"low\" rating, a safety plan must be developed.    A safety plan was indicated: no  If yes, describe in detail n/a    PLAN: Between sessions, Candie Pfeiffer will continue to implement positive coping skills. At the next session, the therapist will use Client-centered Therapy to address anxiety.    Behavioral Health Treatment Plan and Discharge Planning: Candie Pfeiffer is aware of and agrees to continue to work on " their treatment plan. They have identified and are working toward their discharge goals. no    Visit start and stop times:    04/09/24  Start Time: 1330  Stop Time: 1428  Total Visit Time: 58 minutes

## 2024-04-23 ENCOUNTER — TELEPHONE (OUTPATIENT)
Dept: PSYCHIATRY | Facility: CLINIC | Age: 69
End: 2024-04-23

## 2024-04-23 NOTE — TELEPHONE ENCOUNTER
Left detailed message for patient requesting a call back. Provider (Helena Romero) would like to confirm back to back week appts and if she wants to keep them or cx one of them. Left KASEY & Danuta villegas.

## 2024-04-24 ENCOUNTER — TELEMEDICINE (OUTPATIENT)
Dept: PSYCHIATRY | Facility: CLINIC | Age: 69
End: 2024-04-24
Payer: COMMERCIAL

## 2024-04-24 DIAGNOSIS — F43.10 POST TRAUMATIC STRESS DISORDER (PTSD): Primary | ICD-10-CM

## 2024-04-24 PROCEDURE — 90837 PSYTX W PT 60 MINUTES: CPT | Performed by: STUDENT IN AN ORGANIZED HEALTH CARE EDUCATION/TRAINING PROGRAM

## 2024-04-24 NOTE — PSYCH
Virtual Regular Visit    Verification of patient location:    Patient is located at Home in the following state in which I hold an active license [place]      Assessment/Plan:    Problem List Items Addressed This Visit       Post traumatic stress disorder (PTSD) - Primary       Goals addressed in session: Goal 1          Reason for visit is No chief complaint on file.       Encounter provider Helena Romero LCSW    [facility] [address] [address] [place] [address] [phone]      Recent Visits  Date Type Provider Dept   04/24/24 Telemedicine Helena Romero LCSW [facility]   04/23/24 Telephone Helena Romero LCSW [facility]   Showing recent visits within past 7 days and meeting all other requirements  Future Appointments  No visits were found meeting these conditions.  Showing future appointments within next 150 days and meeting all other requirements       The patient was identified by name and date of birth. Candie Pfeiffer was informed that this is a telemedicine visit and that the visit is being conducted throughthe Epic Embedded platform. She agrees to proceed..  My office door was closed. No one else was in the room.  She acknowledged consent and understanding of privacy and security of the video platform. The patient has agreed to participate and understands they can discontinue the visit at any time.    Patient is aware this is a billable service.     Subjective  Candie Pfeiffer is a 68 y.o. female  .      HPI     No past medical history on file.    No past surgical history on file.    No current outpatient medications on file.     No current facility-administered medications for this visit.        Not on File    Review of Systems    Video Exam    There were no vitals filed for this visit.    Physical Exam     Behavioral Health  "Psychotherapy Progress Note    Psychotherapy Provided: Individual Psychotherapy     1. Post traumatic stress disorder (PTSD)            Goals addressed in session: Goal 1     DATA: Candie processed her feelings of worry, sadness and frustration. She expressed feeling sad and worried as her friend may be dying. Candie expressed feeling terrified about her brain surgery but is working hard on managing her emotions and be on the best health to recover quickly from the surgery. Candie   During this session, this clinician used the following therapeutic modalities: Client-centered Therapy    Substance Abuse was not addressed during this session. If the client is diagnosed with a co-occurring substance use disorder, please indicate any changes in the frequency or amount of use: n/a. Stage of change for addressing substance use diagnoses: No substance use/Not applicable    ASSESSMENT:  Candie Pfeiffer presents with a Euthymic/ normal mood.     her affect is Normal range and intensity, which is congruent, with her mood and the content of the session. The client has made progress on their goals.     Candie Pfeiffer presents with a none risk of suicide, none risk of self-harm, and none risk of harm to others.    For any risk assessment that surpasses a \"low\" rating, a safety plan must be developed.    A safety plan was indicated: no  If yes, describe in detail n/a    PLAN: Between sessions, Candie Pfeiffer will continue to implement positive coping skills. At the next session, the therapist will use Client-centered Therapy to address anxiety and PTSD.    Behavioral Health Treatment Plan and Discharge Planning: Candie Pfeiffer is aware of and agrees to continue to work on their treatment plan. They have identified and are working toward their discharge goals. no    Visit start and stop times:    04/24/24  Start Time: 1301  Stop Time: 1400  Total Visit Time: 59 minutes        "

## 2024-05-08 ENCOUNTER — TELEMEDICINE (OUTPATIENT)
Dept: PSYCHIATRY | Facility: CLINIC | Age: 69
End: 2024-05-08
Payer: COMMERCIAL

## 2024-05-08 DIAGNOSIS — F43.10 POST TRAUMATIC STRESS DISORDER (PTSD): Primary | ICD-10-CM

## 2024-05-08 PROCEDURE — 90837 PSYTX W PT 60 MINUTES: CPT | Performed by: STUDENT IN AN ORGANIZED HEALTH CARE EDUCATION/TRAINING PROGRAM

## 2024-05-08 NOTE — PSYCH
Virtual Regular Visit    Verification of patient location:    Patient is located at Home in the following state in which I hold an active license [place]      Assessment/Plan:    Problem List Items Addressed This Visit       Post traumatic stress disorder (PTSD) - Primary       Goals addressed in session: Goal 1          Reason for visit is No chief complaint on file.       Encounter provider Helena Romero LCSW      Recent Visits  No visits were found meeting these conditions.  Showing recent visits within past 7 days and meeting all other requirements  Today's Visits  Date Type Provider Dept   05/08/24 Telemedicine Helena Romero LCSW [facility]   Showing today's visits and meeting all other requirements  Future Appointments  No visits were found meeting these conditions.  Showing future appointments within next 150 days and meeting all other requirements       The patient was identified by name and date of birth. Candie Pfeiffer was informed that this is a telemedicine visit and that the visit is being conducted throughthe Epic Embedded platform. She agrees to proceed..  My office door was closed. No one else was in the room.  She acknowledged consent and understanding of privacy and security of the video platform. The patient has agreed to participate and understands they can discontinue the visit at any time.    Patient is aware this is a billable service.     Subjective  Candie Pfeiffer is a 68 y.o. female  .      HPI     No past medical history on file.    No past surgical history on file.    No current outpatient medications on file.     No current facility-administered medications for this visit.        Not on File    Review of Systems    Video Exam    There were no vitals filed for this visit.    Physical Exam     Behavioral Health Psychotherapy Progress Note    Psychotherapy Provided: Individual Psychotherapy     1. Post traumatic stress disorder (PTSD)    "         Goals addressed in session: Goal 1     DATA: Candie processed her feeling about her friend's health condition. She reported  feel more motivated to workout after going to PT. She is getting ready for her surgery.   During this session, this clinician used the following therapeutic modalities: Client-centered Therapy    Substance Abuse was not addressed during this session. If the client is diagnosed with a co-occurring substance use disorder, please indicate any changes in the frequency or amount of use: N/A. Stage of change for addressing substance use diagnoses: No substance use/Not applicable    ASSESSMENT:  Candie Pfeiffer presents with a Euthymic/ normal mood.     her affect is Normal range and intensity, which is congruent, with her mood and the content of the session. The client has made progress on their goals.     Candie Pfeiffer presents with a none risk of suicide, none risk of self-harm, and none risk of harm to others.    For any risk assessment that surpasses a \"low\" rating, a safety plan must be developed.    A safety plan was indicated: no  If yes, describe in detail N/A    PLAN: Between sessions, Candie Pfeiffer will continue to implement positive coping skills. At the next session, the therapist will use Client-centered Therapy to address anxiety and PTSD.    Behavioral Health Treatment Plan and Discharge Planning: Candie Pfeiffer is aware of and agrees to continue to work on their treatment plan. They have identified and are working toward their discharge goals. no    Visit start and stop times:    05/08/24  Start Time: 1200  Stop Time: 1301  Total Visit Time: 61 minutes        "

## 2024-05-22 ENCOUNTER — TELEMEDICINE (OUTPATIENT)
Dept: PSYCHIATRY | Facility: CLINIC | Age: 69
End: 2024-05-22
Payer: COMMERCIAL

## 2024-05-22 DIAGNOSIS — F43.10 POST TRAUMATIC STRESS DISORDER (PTSD): Primary | ICD-10-CM

## 2024-05-22 PROCEDURE — 90837 PSYTX W PT 60 MINUTES: CPT | Performed by: STUDENT IN AN ORGANIZED HEALTH CARE EDUCATION/TRAINING PROGRAM

## 2024-05-22 NOTE — PSYCH
Virtual Regular Visit    Verification of patient location:    Patient is located at Home in the following state in which I hold an active license [place]      Assessment/Plan:    Problem List Items Addressed This Visit       Post traumatic stress disorder (PTSD) - Primary       Goals addressed in session: Goal 1          Reason for visit is No chief complaint on file.       Encounter provider Helena Romero LCSW      Recent Visits  No visits were found meeting these conditions.  Showing recent visits within past 7 days and meeting all other requirements  Today's Visits  Date Type Provider Dept   05/22/24 Telemedicine Helena Romero LCSW [facility]   Showing today's visits and meeting all other requirements  Future Appointments  No visits were found meeting these conditions.  Showing future appointments within next 150 days and meeting all other requirements       The patient was identified by name and date of birth. Candie Pfeiffer was informed that this is a telemedicine visit and that the visit is being conducted throughthe Epic Embedded platform. She agrees to proceed..  My office door was closed. No one else was in the room.  She acknowledged consent and understanding of privacy and security of the video platform. The patient has agreed to participate and understands they can discontinue the visit at any time.    Patient is aware this is a billable service.     Subjective  Candie Pfeiffer is a 68 y.o. female  .      HPI     No past medical history on file.    No past surgical history on file.    No current outpatient medications on file.     No current facility-administered medications for this visit.        Not on File    Review of Systems    Video Exam    There were no vitals filed for this visit.    Physical Exam     Behavioral Health Psychotherapy Progress Note    Psychotherapy Provided: Individual Psychotherapy     1. Post traumatic stress disorder (PTSD)    "         Goals addressed in session: Goal 1     DATA: Candie processed her feelings about her friend's passing, finding out she has anemia, and helping her older friend. Candie continues to work out and is mindful of her pace and implements strategies that she has learned from PT.   Processed previous traumatic experiences and was able to make connection from current behavior.   During this session, this clinician used the following therapeutic modalities: Client-centered Therapy    Substance Abuse was not addressed during this session. If the client is diagnosed with a co-occurring substance use disorder, please indicate any changes in the frequency or amount of use: n/a. Stage of change for addressing substance use diagnoses: No substance use/Not applicable    ASSESSMENT:  Candie Pfeiffer presents with a Dysthymic mood.     her affect is Normal range and intensity, which is congruent, with her mood and the content of the session. The client has made progress on their goals.     Candie Pfeiffer presents with a none risk of suicide, none risk of self-harm, and none risk of harm to others.    For any risk assessment that surpasses a \"low\" rating, a safety plan must be developed.    A safety plan was indicated: no  If yes, describe in detail n/a    PLAN: Between sessions, Candie Pfeiffer will continue to implement positive coping skills. At the next session, the therapist will use Cognitive Behavioral Therapy to address anxiety.    Behavioral Health Treatment Plan and Discharge Planning: Candie Pfeiffer is aware of and agrees to continue to work on their treatment plan. They have identified and are working toward their discharge goals. no    Visit start and stop times:    05/22/24  Start Time: 1302  Stop Time: 1400  Total Visit Time: 58 minutes        "

## 2024-06-05 ENCOUNTER — TELEMEDICINE (OUTPATIENT)
Dept: PSYCHIATRY | Facility: CLINIC | Age: 69
End: 2024-06-05
Payer: COMMERCIAL

## 2024-06-05 DIAGNOSIS — F43.10 POST TRAUMATIC STRESS DISORDER (PTSD): Primary | ICD-10-CM

## 2024-06-05 PROCEDURE — 90837 PSYTX W PT 60 MINUTES: CPT | Performed by: STUDENT IN AN ORGANIZED HEALTH CARE EDUCATION/TRAINING PROGRAM

## 2024-06-05 NOTE — BH TREATMENT PLAN
"Outpatient Behavioral Health Psychotherapy Treatment Plan    Candie Pfeiffer  1955     Date of Initial Psychotherapy Assessment: 3/13/23   Date of Current Treatment Plan: 06/05/24  Treatment Plan Target Date: 12/05/24  Treatment Plan Expiration Date: 12/05/24    Diagnosis:   1. Post traumatic stress disorder (PTSD)            Area(s) of Need: Candie is very scared about her brain surgery. She is wonders why she puts everyone else before herself and her needs. \"I have always being someone that helps others and I have always being very patient\". I put off my colonoscopy and they found something and they removed it. They told me I have to get a colonoscopy every 5 years. This is my 6th year and I keep putting it off. I don't like the preparation and I don't want to bother my daughter my daughter.   Candie reflected on previous goal of working out more often. She has increased her physical activity but has been inconsistent due to hip pain.     Long Term Goal 1 (in the client's own words): \"I would like to follow up with things that I need to. I need to make myself prioritize\".    Stage of Change: Action    Target Date for completion: 12/05/24     Anticipated therapeutic modalities: Client Centered     People identified to complete this goal: Candie Pfeiffer and Helena Romero LCSW      Objective 1: (identify the means of measuring success in meeting the objective): \"Candie will identify intrusive thoughts and implement positive coping skills to manage her feelings of anxiety\"            I am currently under the care of a [facility] psychiatric provider: no    My [facility] psychiatric provider is: n/a    I am currently taking psychiatric medications: No    I feel that I will be ready for discharge from mental health care when I reach the following (measurable goal/objective): \"When these thoughts, when I am trying to go to sleep, what happened to me when I was little -remembering my mom telling me \"I " "don't have the money for you to get sick\", and other thoughts about previous traumatic experiences. I would like to stop beating myself up\".    For children and adults who have a legal guardian:   Has there been any change to custody orders and/or guardianship status? NA. If yes, attach updated documentation.    I have created my Crisis Plan and have been offered a copy of this plan    Behavioral Health Treatment Plan [facility]: Diagnosis and Treatment Plan explained to Candie Pfeiffer acknowledges an understanding of their diagnosis. Candie Pfeiffer agrees to this treatment plan.    I have been offered a copy of this Treatment Plan. yes        "

## 2024-06-05 NOTE — PSYCH
Virtual Regular Visit    Verification of patient location:    Patient is located at Home in the following state in which I hold an active license [place]      Assessment/Plan:    Problem List Items Addressed This Visit       Post traumatic stress disorder (PTSD) - Primary       Goals addressed in session: Goal 1          Reason for visit is No chief complaint on file.       Encounter provider Helena Romero LCSW      Recent Visits  No visits were found meeting these conditions.  Showing recent visits within past 7 days and meeting all other requirements  Today's Visits  Date Type Provider Dept   06/05/24 Telemedicine Helena Romero LCSW [facility]   Showing today's visits and meeting all other requirements  Future Appointments  No visits were found meeting these conditions.  Showing future appointments within next 150 days and meeting all other requirements       The patient was identified by name and date of birth. Candie Pfeiffer was informed that this is a telemedicine visit and that the visit is being conducted throughthe Epic Embedded platform. She agrees to proceed..  My office door was closed. No one else was in the room.  She acknowledged consent and understanding of privacy and security of the video platform. The patient has agreed to participate and understands they can discontinue the visit at any time.    Patient is aware this is a billable service.     Subjective  Candie Pfeiffer is a 68 y.o. female  .      HPI     No past medical history on file.    No past surgical history on file.    No current outpatient medications on file.     No current facility-administered medications for this visit.        Not on File    Review of Systems    Video Exam    There were no vitals filed for this visit.    Physical Exam     Behavioral Health Psychotherapy Progress Note    Psychotherapy Provided: Individual Psychotherapy     1. Post traumatic stress disorder (PTSD)    "         Goals addressed in session: Goal 1     DATA: Has created to-do list of things about her health that need to take care to prioritize her health. Reflected on her behavior of putting her friends first over her needs. She is worried about her surgery and her most recent lab results. She is worried about getting dementia but denies any symptoms.   Reflected on previous traumatic experiences. Candie is practicing positive self-talk and find is helpful to recover and heal from these experiences.   During this session, this clinician used the following therapeutic modalities: Client-centered Therapy    Substance Abuse was not addressed during this session. If the client is diagnosed with a co-occurring substance use disorder, please indicate any changes in the frequency or amount of use: N/A. Stage of change for addressing substance use diagnoses: No substance use/Not applicable    ASSESSMENT:  Candie Pfeiffer presents with a Anxious mood.     her affect is Normal range and intensity, which is congruent, with her mood and the content of the session. The client has made progress on their goals.     Candie Pfeiffer presents with a none risk of suicide, none risk of self-harm, and none risk of harm to others.    For any risk assessment that surpasses a \"low\" rating, a safety plan must be developed.    A safety plan was indicated: no  If yes, describe in detail n/a    PLAN: Between sessions, Candie Pfeiffer will continue to implement positive coping skills. At the next session, the therapist will use Client-centered Therapy to address anxiety and PTSD.    Behavioral Health Treatment Plan and Discharge Planning: Candie Pfeiffer is aware of and agrees to continue to work on their treatment plan. They have identified and are working toward their discharge goals. no    Visit start and stop times:    06/05/24  Start Time: 1304  Stop Time: 1359  Total Visit Time: 55 minutes      "

## 2024-06-20 ENCOUNTER — TELEMEDICINE (OUTPATIENT)
Dept: PSYCHIATRY | Facility: CLINIC | Age: 69
End: 2024-06-20
Payer: COMMERCIAL

## 2024-06-20 DIAGNOSIS — F43.10 POST TRAUMATIC STRESS DISORDER (PTSD): Primary | ICD-10-CM

## 2024-06-20 PROCEDURE — 90837 PSYTX W PT 60 MINUTES: CPT | Performed by: STUDENT IN AN ORGANIZED HEALTH CARE EDUCATION/TRAINING PROGRAM

## 2024-06-20 NOTE — PSYCH
Virtual Regular Visit    Verification of patient location:    Patient is located at Home in the following state in which I hold an active license [place]      Assessment/Plan:    Problem List Items Addressed This Visit       Post traumatic stress disorder (PTSD) - Primary       Goals addressed in session: Goal 1          Reason for visit is No chief complaint on file.       Encounter provider Helena Romero LCSW      Recent Visits  Date Type Provider Dept   06/20/24 Telemedicine Helena Romero LCSW [facility]   Showing recent visits within past 7 days and meeting all other requirements  Future Appointments  No visits were found meeting these conditions.  Showing future appointments within next 150 days and meeting all other requirements       The patient was identified by name and date of birth. Candie Pfeiffer was informed that this is a telemedicine visit and that the visit is being conducted throughthe Epic Embedded platform. She agrees to proceed..  My office door was closed. No one else was in the room.  She acknowledged consent and understanding of privacy and security of the video platform. The patient has agreed to participate and understands they can discontinue the visit at any time.    Patient is aware this is a billable service.     Subjective  Candie Pfeiffer is a 68 y.o. female  .      HPI     No past medical history on file.    No past surgical history on file.    No current outpatient medications on file.     No current facility-administered medications for this visit.        Not on File    Review of Systems    Video Exam    There were no vitals filed for this visit.    Physical Exam     Behavioral Health Psychotherapy Progress Note    Psychotherapy Provided: Individual Psychotherapy     1. Post traumatic stress disorder (PTSD)            Goals addressed in session: Goal 1     DATA: Candie processed her feelings of frustration from all of her doctor's  "appointment. Candie processed her feeling of anxiety and worry for her upcoming surgery.   Candie talked about her surgery and her feelings of anxiety but hope to have it. She is working on eating better/healthier  During this session, this clinician used the following therapeutic modalities: Cognitive Behavioral Therapy    Substance Abuse was not addressed during this session. If the client is diagnosed with a co-occurring substance use disorder, please indicate any changes in the frequency or amount of use: n/a. Stage of change for addressing substance use diagnoses: No substance use/Not applicable    ASSESSMENT:  Candie Pfeiffer presents with a Anxious mood.     her affect is Normal range and intensity, which is congruent, with her mood and the content of the session. The client  made progress on their goals.     Candie Pfeiffer presents with a none risk of suicide, none risk of self-harm, and none risk of harm to others.    For any risk assessment that surpasses a \"low\" rating, a safety plan must be developed.    A safety plan was indicated: no  If yes, describe in detail n/a    PLAN: Between sessions, Candie Pfeiffer will continue to implement positive coping skills. At the next session, the therapist will use Client-centered Therapy to address anxiety and PTSD.    Behavioral Health Treatment Plan and Discharge Planning: Candie Pfeiffer is aware of and agrees to continue to work on their treatment plan. They have identified and are working toward their discharge goals. no    Visit start and stop times:    06/20/24  Start Time: 1304  Stop Time: 1400  Total Visit Time: 56 minutes        "

## 2024-06-20 NOTE — BH CRISIS PLAN
Client Name: Candie Pfeiffer       Client YOB: 1955    FrancoisTheodore Safety Plan      Creation Date: 6/20/24 Update Date: 6/20/25   Created By: Helena Romero LCSW       Step 1: Warning Signs:   Warning Signs   Isolation   lack of energy   no motivation   not wanting to eat or cook   not wanting to do my normal routine things in my house   not wanting to get ouf bec            Step 2: Internal Coping Strategies:   Internal Coping Strategies   getting a shower   calling a friend to chack on them            Step 3: People and social settings that provide distraction:   Name Contact Information   Friend- Warren in phone    Places   Go to my friend's appartment   go to my daughter's appoartment   Thrifting   go for a walk           Step 4: People whom I can ask for help during a crisis:      Name Contact Information    Anna (daughter) in-phone      Step 5: Professionals or agencies I can contact during a crisis:      Clinican/Agency Name Phone Emergency Contact    Helena Romero LCSW [facility] Emergency Department Emergency Department Phone Emergency Department Address    [facility] [phone] [address] [place] [id number]        Crisis Phone Numbers:   Suicide Prevention Lifeline: Call or Text  987 Crisis Text Line: Text HOME to 471-951   Please note: Some [facility] do not have a separate number for Child/Adolescent specific crisis. If your county is not listed under Child/Adolescent, please call the adult number for your county      Adult Crisis Numbers: Child/Adolescent Crisis Numbers   [facility]: [phone] [facility]: [phone]   [facility]: [phone] [facility]: [phone]   [facility]: [phone] [place]: [phone]   [facility]: [phone] [place]: [phone]   [place] [place]: [phone]   [facility]: [phone]   [facility]: [phone]    [facility]: [phone] (daytime) [phone] (after hours, weekends, holidays)      Step 6: Making the environment safer (plan for lethal means safety):   Patient did not identify any lethal methods: Yes     Optional: What is most important to me and worth living for?   My kids and my grandson.     Jovanna Safety Plan. Alaina Parrish and Fox Jaimes. Used with permission of the authors.

## 2024-07-03 ENCOUNTER — TELEMEDICINE (OUTPATIENT)
Dept: PSYCHIATRY | Facility: CLINIC | Age: 69
End: 2024-07-03
Payer: COMMERCIAL

## 2024-07-03 DIAGNOSIS — F43.10 POST TRAUMATIC STRESS DISORDER (PTSD): Primary | ICD-10-CM

## 2024-07-03 PROCEDURE — 90837 PSYTX W PT 60 MINUTES: CPT | Performed by: STUDENT IN AN ORGANIZED HEALTH CARE EDUCATION/TRAINING PROGRAM

## 2024-07-03 NOTE — PSYCH
Virtual Regular Visit    Verification of patient location:    Patient is located at Home in the following state in which I hold an active license [place]      Assessment/Plan:    Problem List Items Addressed This Visit       Post traumatic stress disorder (PTSD) - Primary       Goals addressed in session: Goal 1          Reason for visit is No chief complaint on file.       Encounter provider Helena Romero LCSW      Recent Visits  No visits were found meeting these conditions.  Showing recent visits within past 7 days and meeting all other requirements  Today's Visits  Date Type Provider Dept   07/03/24 Telemedicine Helena Romero LCSW [facility]   Showing today's visits and meeting all other requirements  Future Appointments  No visits were found meeting these conditions.  Showing future appointments within next 150 days and meeting all other requirements       The patient was identified by name and date of birth. Candie Pfeiffer was informed that this is a telemedicine visit and that the visit is being conducted throughthe Epic Embedded platform. She agrees to proceed..  My office door was closed. No one else was in the room.  She acknowledged consent and understanding of privacy and security of the video platform. The patient has agreed to participate and understands they can discontinue the visit at any time.    Patient is aware this is a billable service.     Subjective  Candie Pfeiffer is a 68 y.o. female  .      HPI     No past medical history on file.    No past surgical history on file.    No current outpatient medications on file.     No current facility-administered medications for this visit.        Not on File    Review of Systems    Video Exam    There were no vitals filed for this visit.    Physical Exam     Behavioral Health Psychotherapy Progress Note    Psychotherapy Provided: Individual Psychotherapy     1. Post traumatic stress disorder (PTSD)    "         Goals addressed in session: Goal 1     DATA: Candie processed her feelings about her surgery. Candie reported feeling very anxious about her surgery, however she finds her self more worried about her daughter having to take time off from work to care for her. Candie talked about her scissure almost 10 years ago. She is optimistic to manage her symptoms of anxiety.   During this session, this clinician used the following therapeutic modalities: Client-centered Therapy    Substance Abuse was not addressed during this session. If the client is diagnosed with a co-occurring substance use disorder, please indicate any changes in the frequency or amount of use: n/a. Stage of change for addressing substance use diagnoses: No substance use/Not applicable    ASSESSMENT:  Candie Pfeiffer presents with a Euthymic/ normal mood.     her affect is Normal range and intensity, which is congruent, with her mood and the content of the session. The client has made progress on their goals.     Candie Pfeiffer presents with a none risk of suicide, none risk of self-harm, and none risk of harm to others.    For any risk assessment that surpasses a \"low\" rating, a safety plan must be developed.    A safety plan was indicated: no  If yes, describe in detail n/a    PLAN: Between sessions, Candie Pfeiffer will continue to implement positive coping skills. At the next session, the therapist will use Client-centered Therapy to address PTSD.    Behavioral Health Treatment Plan and Discharge Planning: Candie Pfeiffer is aware of and agrees to continue to work on their treatment plan. They have identified and are working toward their discharge goals. no    Visit start and stop times:    07/03/24  Start Time: 1302  Stop Time: 1337  Total Visit Time: 35 minutes        "

## 2024-07-17 ENCOUNTER — TELEMEDICINE (OUTPATIENT)
Dept: PSYCHIATRY | Facility: CLINIC | Age: 69
End: 2024-07-17
Payer: COMMERCIAL

## 2024-07-17 DIAGNOSIS — F43.10 POST TRAUMATIC STRESS DISORDER (PTSD): Primary | ICD-10-CM

## 2024-07-17 PROCEDURE — 90837 PSYTX W PT 60 MINUTES: CPT | Performed by: STUDENT IN AN ORGANIZED HEALTH CARE EDUCATION/TRAINING PROGRAM

## 2024-07-17 NOTE — PSYCH
Virtual Regular Visit    Verification of patient location:    Patient is located at Home in the following state in which I hold an active license [place]      Assessment/Plan:    Problem List Items Addressed This Visit       Post traumatic stress disorder (PTSD) - Primary       Goals addressed in session: Goal 1          Reason for visit is No chief complaint on file.       Encounter provider Helena Romero LCSW      Recent Visits  No visits were found meeting these conditions.  Showing recent visits within past 7 days and meeting all other requirements  Today's Visits  Date Type Provider Dept   07/17/24 Telemedicine Helena Romero LCSW [facility]   Showing today's visits and meeting all other requirements  Future Appointments  No visits were found meeting these conditions.  Showing future appointments within next 150 days and meeting all other requirements       The patient was identified by name and date of birth. Candie Pfeiffer was informed that this is a telemedicine visit and that the visit is being conducted throughthe Epic Embedded platform. She agrees to proceed..  My office door was closed. No one else was in the room.  She acknowledged consent and understanding of privacy and security of the video platform. The patient has agreed to participate and understands they can discontinue the visit at any time.    Patient is aware this is a billable service.     Subjective  Candie Pfeiffer is a 68 y.o. female  .      HPI     No past medical history on file.    No past surgical history on file.    No current outpatient medications on file.     No current facility-administered medications for this visit.        Not on File    Review of Systems    Video Exam    There were no vitals filed for this visit.    Physical Exam     Behavioral Health Psychotherapy Progress Note    Psychotherapy Provided: Individual Psychotherapy     1. Post traumatic stress disorder (PTSD)    "         Goals addressed in session: Goal 1     DATA: Candie processed her feelings about her brain surgery tomorrow. She reports feeling very anxious to get it over with and hopefully see and improvement. Candie reflected on her relation with her friend Kaur and her feelings of frustration from her behavior.   During this session, this clinician used the following therapeutic modalities: Client-centered Therapy    Substance Abuse was not addressed during this session. If the client is diagnosed with a co-occurring substance use disorder, please indicate any changes in the frequency or amount of use: n/a. Stage of change for addressing substance use diagnoses: No substance use/Not applicable    ASSESSMENT:  Candie Pfeiffer presents with a Anxious mood.     her affect is Normal range and intensity, which is congruent, with her mood and the content of the session. The client has made progress on their goals.     Candie Pfeiffer presents with a none risk of suicide, none risk of self-harm, and none risk of harm to others.    For any risk assessment that surpasses a \"low\" rating, a safety plan must be developed.    A safety plan was indicated: no  If yes, describe in detail n/a    PLAN: Between sessions, Candie Pfeiffer will continue to implement positive coping skills. At the next session, the therapist will use Client-centered Therapy to address anxiety.    Behavioral Health Treatment Plan and Discharge Planning: Candie Pfeiffer is aware of and agrees to continue to work on their treatment plan. They have identified and are working toward their discharge goals. no    Visit start and stop times:    07/17/24  Start Time: 1305  Stop Time: 1400  Total Visit Time: 55 minutes        "

## 2024-08-07 ENCOUNTER — TELEMEDICINE (OUTPATIENT)
Dept: PSYCHIATRY | Facility: CLINIC | Age: 69
End: 2024-08-07
Payer: COMMERCIAL

## 2024-08-07 DIAGNOSIS — F43.10 POST TRAUMATIC STRESS DISORDER (PTSD): Primary | ICD-10-CM

## 2024-08-07 PROCEDURE — 90837 PSYTX W PT 60 MINUTES: CPT | Performed by: STUDENT IN AN ORGANIZED HEALTH CARE EDUCATION/TRAINING PROGRAM

## 2024-08-07 NOTE — PSYCH
Virtual Regular Visit    Verification of patient location:    Patient is located at Home in the following state in which I hold an active license [place]      Assessment/Plan:    Problem List Items Addressed This Visit       Post traumatic stress disorder (PTSD) - Primary       Goals addressed in session: Goal 1          Reason for visit is No chief complaint on file.       Encounter provider Helena Romero LCSW      Recent Visits  No visits were found meeting these conditions.  Showing recent visits within past 7 days and meeting all other requirements  Today's Visits  Date Type Provider Dept   08/07/24 Telemedicine Helena Romero LCSW [facility]   Showing today's visits and meeting all other requirements  Future Appointments  No visits were found meeting these conditions.  Showing future appointments within next 150 days and meeting all other requirements       The patient was identified by name and date of birth. Candie Pfeiffer was informed that this is a telemedicine visit and that the visit is being conducted throughthe Epic Embedded platform. She agrees to proceed..  My office door was closed. No one else was in the room.  She acknowledged consent and understanding of privacy and security of the video platform. The patient has agreed to participate and understands they can discontinue the visit at any time.    Patient is aware this is a billable service.     Subjective  Candie Pfeiffer is a 68 y.o. female  .      HPI     No past medical history on file.    No past surgical history on file.    No current outpatient medications on file.     No current facility-administered medications for this visit.        Not on File    Review of Systems    Video Exam    There were no vitals filed for this visit.    Physical Exam     Behavioral Health Psychotherapy Progress Note    Psychotherapy Provided: Individual Psychotherapy     1. Post traumatic stress disorder (PTSD)    "         Goals addressed in session: Goal 1     DATA: Candie processed her feelings and thought about her brain surgery. She is recovering really well and is going back to normal activities. Candie reported happiness and hope to see positive results from this procedure. She has not noticed any decrease on her tremors but they are not supposed to happen until after her machine is connected. Candie expressed some worries about money after getting some medical bills. Candie is able to identify intrusive thoughts and use CBT and MBSR strategies to manage them. She uses humor as her main coping mechanism. During this session, Candie was informed of transfer of care.   During this session, this clinician used the following therapeutic modalities: Client-centered Therapy    Substance Abuse was not addressed during this session. If the client is diagnosed with a co-occurring substance use disorder, please indicate any changes in the frequency or amount of use: N/A. Stage of change for addressing substance use diagnoses: No substance use/Not applicable    ASSESSMENT:  Candie Pfeiffer presents with a Euthymic/ normal mood.     her affect is Normal range and intensity, which is congruent, with her mood and the content of the session. The client has made progress on their goals.     Candie Pfeiffer presents with a none risk of suicide, none risk of self-harm, and none risk of harm to others.    For any risk assessment that surpasses a \"low\" rating, a safety plan must be developed.    A safety plan was indicated: no  If yes, describe in detail N/A    PLAN: Between sessions, Candie Pfeiffer will continue to implement positive coping skills. At the next session, the therapist will use Client-centered Therapy to address PTSD.    Behavioral Health Treatment Plan and Discharge Planning: Candie Pfeiffer is aware of and agrees to continue to work on their treatment plan. They have identified and are working toward their " discharge goals. no    Visit start and stop times:    08/07/24  Start Time: 1300  Stop Time: 1355  Total Visit Time: 55 minutes

## 2024-08-19 ENCOUNTER — TELEMEDICINE (OUTPATIENT)
Dept: PSYCHIATRY | Facility: CLINIC | Age: 69
End: 2024-08-19
Payer: COMMERCIAL

## 2024-08-19 DIAGNOSIS — F43.10 POST TRAUMATIC STRESS DISORDER (PTSD): Primary | ICD-10-CM

## 2024-08-19 PROCEDURE — 90837 PSYTX W PT 60 MINUTES: CPT | Performed by: STUDENT IN AN ORGANIZED HEALTH CARE EDUCATION/TRAINING PROGRAM

## 2024-08-19 NOTE — PSYCH
Virtual Regular Visit    Verification of patient location:    Patient is located at Home in the following state in which I hold an active license [place]      Assessment/Plan:    Problem List Items Addressed This Visit       Post traumatic stress disorder (PTSD) - Primary       Goals addressed in session: Goal 1          Reason for visit is No chief complaint on file.       Encounter provider Helena Romero LCSW      Recent Visits  No visits were found meeting these conditions.  Showing recent visits within past 7 days and meeting all other requirements  Today's Visits  Date Type Provider Dept   08/19/24 Telemedicine Helena Romero LCSW [facility]   Showing today's visits and meeting all other requirements  Future Appointments  No visits were found meeting these conditions.  Showing future appointments within next 150 days and meeting all other requirements       The patient was identified by name and date of birth. Candie Pfeiffer was informed that this is a telemedicine visit and that the visit is being conducted throughthe Epic Embedded platform. She agrees to proceed..  My office door was closed. No one else was in the room.  She acknowledged consent and understanding of privacy and security of the video platform. The patient has agreed to participate and understands they can discontinue the visit at any time.    Patient is aware this is a billable service.     Subjective  Candie Pfeiffer is a 68 y.o. female  .      HPI     No past medical history on file.    No past surgical history on file.    No current outpatient medications on file.     No current facility-administered medications for this visit.        Not on File    Review of Systems    Video Exam    There were no vitals filed for this visit.    Physical Exam     Behavioral Health Psychotherapy Progress Note    Psychotherapy Provided: Individual Psychotherapy     1. Post traumatic stress disorder (PTSD)    "         Goals addressed in session: Goal 1     DATA: Candie processed her feelings of excitement after seeing the positive results of his surgery.   During this session, this clinician used the following therapeutic modalities: Client-centered Therapy    Substance Abuse was not addressed during this session. If the client is diagnosed with a co-occurring substance use disorder, please indicate any changes in the frequency or amount of use: n/a. Stage of change for addressing substance use diagnoses: No substance use/Not applicable    ASSESSMENT:  Candie Pfeiffer presents with a Euthymic/ normal mood.     her affect is Normal range and intensity, which is congruent, with her mood and the content of the session. The client has made progress on their goals.     Candie Pfeiffer presents with a none risk of suicide, none risk of self-harm, and none risk of harm to others.    For any risk assessment that surpasses a \"low\" rating, a safety plan must be developed.    A safety plan was indicated: no  If yes, describe in detail n/a    PLAN: Between sessions, Candie Pfeiffer will continue to implement positive coping skills. At the next session, the therapist will use Client-centered Therapy to address PTSD.    Behavioral Health Treatment Plan and Discharge Planning: Candie Pfeiffer is aware of and agrees to continue to work on their treatment plan. They have identified and are working toward their discharge goals. no    Psychotherapy Discharge Summary    Preferred Name: Candie Pfeiffer  YOB: 1955    Admission date to psychotherapy: 3/13/23    Referred by: Self    Presenting Problem: PTSD    Course of treatment included : individual therapy     Progress/Outcome of Treatment Goals (brief summary of course of treatment) Candie made significant progress processing previous traumatic experiences, using CBT and MBSR strategies to manage feelings and thoughts.     Treatment Complications (if " any): none    Treatment Progress: excellent    Current [facility] Provider: n/a    Discharge Medications include: n/a    Discharge Date: 8/19/24    Discharge Diagnosis:   1. Post traumatic stress disorder (PTSD)            Criteria for Discharge:  Completed treatment plan goal of having brain surgery. Therapist leaving practice. Candie chose to be discharged from services at this time.    Aftercare recommendations include (include specific referral names and phone numbers, if appropriate): Continue to implement positive coping skills to manage daily feelings.    Prognosis: excellent      Visit start and stop times:    08/19/24  Start Time: 1400  Stop Time: 1459  Total Visit Time: 59 minutes

## 2024-08-21 ENCOUNTER — TELEPHONE (OUTPATIENT)
Dept: PSYCHIATRY | Facility: CLINIC | Age: 69
End: 2024-08-21

## 2024-09-03 ENCOUNTER — OFFICE VISIT (OUTPATIENT)
Dept: OBGYN CLINIC | Facility: CLINIC | Age: 69
End: 2024-09-03
Payer: COMMERCIAL

## 2024-09-03 VITALS
BODY MASS INDEX: 36.7 KG/M2 | WEIGHT: 233.8 LBS | SYSTOLIC BLOOD PRESSURE: 144 MMHG | HEIGHT: 67 IN | DIASTOLIC BLOOD PRESSURE: 82 MMHG

## 2024-09-03 DIAGNOSIS — Z01.419 ENCOUNTER FOR GYNECOLOGICAL EXAMINATION WITHOUT ABNORMAL FINDING: Primary | ICD-10-CM

## 2024-09-03 DIAGNOSIS — Z78.0 POSTMENOPAUSAL: ICD-10-CM

## 2024-09-03 DIAGNOSIS — Z12.31 ENCOUNTER FOR SCREENING MAMMOGRAM FOR MALIGNANT NEOPLASM OF BREAST: ICD-10-CM

## 2024-09-03 DIAGNOSIS — Z12.4 SCREENING FOR CERVICAL CANCER: ICD-10-CM

## 2024-09-03 DIAGNOSIS — N39.3 SUI (STRESS URINARY INCONTINENCE, FEMALE): ICD-10-CM

## 2024-09-03 PROCEDURE — G0101 CA SCREEN;PELVIC/BREAST EXAM: HCPCS | Performed by: OBSTETRICS & GYNECOLOGY

## 2024-09-03 RX ORDER — OXCARBAZEPINE 600 MG/1
600 TABLET, FILM COATED ORAL EVERY 12 HOURS SCHEDULED
COMMUNITY

## 2024-09-03 RX ORDER — ASPIRIN 81 MG/1
81 TABLET, CHEWABLE ORAL
COMMUNITY

## 2024-09-03 RX ORDER — FERROUS GLUCONATE 324(38)MG
65 TABLET ORAL
COMMUNITY

## 2024-09-03 RX ORDER — LEVOTHYROXINE SODIUM 75 UG/1
88 TABLET ORAL DAILY
COMMUNITY

## 2024-09-03 RX ORDER — ESCITALOPRAM OXALATE 20 MG/1
20 TABLET ORAL DAILY
COMMUNITY

## 2024-09-03 RX ORDER — AMLODIPINE BESYLATE 10 MG/1
10 TABLET ORAL DAILY
COMMUNITY

## 2024-09-03 RX ORDER — ROPINIROLE 2 MG/1
2 TABLET, FILM COATED, EXTENDED RELEASE ORAL
COMMUNITY

## 2024-09-03 RX ORDER — CARBIDOPA AND LEVODOPA 25; 100 MG/1; MG/1
1 TABLET, ORALLY DISINTEGRATING ORAL 3 TIMES DAILY
COMMUNITY

## 2024-09-03 RX ORDER — SELEGILINE HYDROCHLORIDE 5 MG/1
5 CAPSULE ORAL
COMMUNITY

## 2024-09-03 NOTE — PROGRESS NOTES
[facility] - [address], [address], [place] [address]    ASSESSMENT/PLAN: Candie Pfeiffer is a 68 y.o.  who presents for annual gynecologic exam.    Encounter for routine gynecologic examination  - Routine well woman exam completed today.  - Cervical Cancer Screening: Current ASCCP Guidelines reviewed. Last Pap: Not on file . Next Pap Due: today   - COVID vaccine   pt  up todate  flu shots up  todate    - Breast Cancer Screening: Last Mammogram Not on file,  2023   - Colorectal cancer screening was not ordered. Dr geraldo carpenter    - The following were reviewed in today's visit: breast self exam, mammography screening ordered, menopause, adequate intake of calcium and vitamin D, exercise, healthy diet, and colonoscopy discussed and ordered    Additional problems addressed during this visit:  1. Encounter for gynecological examination without abnormal finding  2. Encounter for screening mammogram for malignant neoplasm of breast  -     Mammo screening bilateral w 3d & cad; Future  3. Screening for cervical cancer  -     IGP, Aptima HPV, Rfx 16/18,45  4. Postmenopausal  5. LINDA (stress urinary incontinence, female)      CC:  Annual Gynecologic Examination    HPI: Candie Pfeiffer is a 68 y.o.  who presents for annual gynecologic examination.  67 yo  here for  wellness exam.  Not sexually active.   + Parkinson  disease and  has implant placed  in brain to decrease  tremors.  Needs adjustment  .  No bleeding, bloating or satiety       The following portions of the patient's history were reviewed and updated as appropriate: She  has a past medical history of Anemia, Depression (May 2004), Hypertension, Hypothyroidism, Kidney stone, and Parkinsons.  She  has a past surgical history that includes  section ( & ); Cholecystectomy (1983); and Brain surgery (2024).  Her family history includes Diabetes in her mother; Heart attack in her brother, mother, and  "sister.  She  reports that she has never smoked. She has never been exposed to tobacco smoke. She has never used smokeless tobacco. She reports that she does not drink alcohol and does not use drugs.  Current Outpatient Medications   Medication Sig Dispense Refill   • amLODIPine (NORVASC) 10 mg tablet Take 10 mg by mouth daily     • aspirin 81 mg chewable tablet Chew 81 mg     • B Complex-Folic Acid (B COMPLEX VITAMINS, W/ FA, PO) Take 1 capsule by mouth daily     • carbidopa-levodopa (PARCOPA)  mg per disintegrating tablet Take 1 tablet by mouth 3 (three) times a day     • escitalopram (LEXAPRO) 20 mg tablet Take 20 mg by mouth daily     • ferrous gluconate (FERGON) 324 mg tablet Take 65 mg by mouth daily with breakfast     • levothyroxine 75 mcg tablet Take 88 mcg by mouth daily     • OXcarbazepine (TRILEPTAL) 600 mg tablet Take 600 mg by mouth every 12 (twelve) hours     • rOPINIRole (REQUIP XL) 2 MG 24 hr tablet Take 2 mg by mouth daily at bedtime     • selegiline (ELDEPRYL) 5 mg capsule Take 5 mg by mouth 2 (two) times a day before meals       No current facility-administered medications for this visit.     She is allergic to medical tape and sulfa antibiotics..    Review of Systems:  All systems normal except as noted in HPI          Objective:  /82 (BP Location: Left arm, Patient Position: Sitting, Cuff Size: Large)   Ht 5' 7\" (1.702 m)   Wt 106 kg (233 lb 12.8 oz)   BMI 36.62 kg/m²    Physical Exam  Vitals and nursing note reviewed.   Constitutional:       Appearance: Normal appearance.   HENT:      Head: Normocephalic.   Cardiovascular:      Rate and Rhythm: Normal rate and regular rhythm.      Pulses: Normal pulses.      Heart sounds: Normal heart sounds.   Pulmonary:      Effort: Pulmonary effort is normal.      Breath sounds: Normal breath sounds.   Chest:      Chest wall: No mass, lacerations, swelling, tenderness or edema.   Breasts:     Brad Score is 4.      Breasts are symmetrical. "      Right: Normal. No swelling, bleeding, inverted nipple, mass, nipple discharge, skin change or tenderness.      Left: No swelling, bleeding, inverted nipple, mass, nipple discharge, skin change or tenderness.   Abdominal:      General: Abdomen is flat. Bowel sounds are normal.      Palpations: Abdomen is soft.   Genitourinary:     General: Normal vulva.      Exam position: Lithotomy position.      Pubic Area: No rash.       Brad stage (genital): 4.      Labia:         Right: No rash, tenderness or lesion.         Left: No rash, tenderness or lesion.       Urethra: No urethral pain, urethral swelling or urethral lesion.      Vagina: Normal.      Cervix: No cervical motion tenderness or discharge.      Uterus: Normal.       Adnexa: Right adnexa normal and left adnexa normal.      Rectum: Normal.      Comments: Difficult to assess due to habitus and  positioning   Musculoskeletal:         General: Normal range of motion.      Cervical back: Neck supple.   Lymphadenopathy:      Upper Body:      Right upper body: No supraclavicular, axillary or pectoral adenopathy.      Left upper body: No supraclavicular, axillary or pectoral adenopathy.      Lower Body: No right inguinal adenopathy. No left inguinal adenopathy.   Skin:     General: Skin is warm and dry.   Neurological:      General: No focal deficit present.      Mental Status: She is alert and oriented to person, place, and time.      Coordination: Coordination abnormal.      Gait: Gait abnormal.   Psychiatric:         Mood and Affect: Mood normal.         Behavior: Behavior normal.         Thought Content: Thought content normal.         Judgment: Judgment normal.

## 2024-09-03 NOTE — PATIENT INSTRUCTIONS
Calcium 1200-1500mg + 800-1000 IU Vit D daily unless otherwise directed. Avoid falls. Exercise 150 minutes per week minimum including weight bearing exercises. DEXA scan-       . No further paps after age 65 as long as there has been adequate normal paps completed, no history of LISSY 2  or a more severe pap diagnosis in the last 20 years.   Annual mammogram and monthly breast self exam recommended .   Colonoscopy-  pt states up to date       .  Kegels 20 times twice daily. Silicone based lubricant with sex. Vaginal moisturizers twice weekly as needed.

## 2024-09-06 LAB
CYTOLOGIST CVX/VAG CYTO: NORMAL
DX ICD CODE: NORMAL
HPV GENOTYPE REFLEX: NORMAL
HPV I/H RISK 4 DNA CVX QL PROBE+SIG AMP: NEGATIVE
OTHER STN SPEC: NORMAL
PATH REPORT.FINAL DX SPEC: NORMAL
SL AMB NOTE:: NORMAL
SL AMB SPECIMEN ADEQUACY: NORMAL
SL AMB TEST METHODOLOGY: NORMAL